# Patient Record
Sex: FEMALE | Race: BLACK OR AFRICAN AMERICAN | Employment: OTHER
[De-identification: names, ages, dates, MRNs, and addresses within clinical notes are randomized per-mention and may not be internally consistent; named-entity substitution may affect disease eponyms.]

---

## 2023-05-19 RX ORDER — GABAPENTIN 300 MG/1
CAPSULE ORAL
COMMUNITY
Start: 2022-03-06

## 2023-05-19 RX ORDER — ACETAMINOPHEN 500 MG
1000 TABLET ORAL EVERY 6 HOURS PRN
COMMUNITY
Start: 2021-08-12

## 2023-05-19 RX ORDER — IBUPROFEN 600 MG/1
600 TABLET ORAL EVERY 6 HOURS PRN
COMMUNITY
Start: 2021-08-12

## 2023-05-19 RX ORDER — FLUOXETINE HYDROCHLORIDE 20 MG/1
60 CAPSULE ORAL DAILY
COMMUNITY

## 2023-05-19 RX ORDER — ALBUTEROL SULFATE 90 UG/1
2 AEROSOL, METERED RESPIRATORY (INHALATION) EVERY 4 HOURS PRN
COMMUNITY

## 2023-05-19 RX ORDER — METHYLPREDNISOLONE 4 MG/1
TABLET ORAL
COMMUNITY
Start: 2022-03-06

## 2024-03-14 ENCOUNTER — OFFICE VISIT (OUTPATIENT)
Facility: CLINIC | Age: 44
End: 2024-03-14
Payer: MEDICAID

## 2024-03-14 ENCOUNTER — HOSPITAL ENCOUNTER (OUTPATIENT)
Facility: HOSPITAL | Age: 44
Setting detail: SPECIMEN
Discharge: HOME | End: 2024-03-14
Payer: MEDICAID

## 2024-03-14 VITALS
TEMPERATURE: 97.7 F | RESPIRATION RATE: 16 BRPM | WEIGHT: 186 LBS | DIASTOLIC BLOOD PRESSURE: 68 MMHG | BODY MASS INDEX: 34.23 KG/M2 | HEART RATE: 75 BPM | SYSTOLIC BLOOD PRESSURE: 118 MMHG | OXYGEN SATURATION: 96 % | HEIGHT: 62 IN

## 2024-03-14 DIAGNOSIS — E11.22 TYPE 2 DIABETES MELLITUS WITH STAGE 4 CHRONIC KIDNEY DISEASE, WITH LONG-TERM CURRENT USE OF INSULIN (HCC): ICD-10-CM

## 2024-03-14 DIAGNOSIS — Z79.4 TYPE 2 DIABETES MELLITUS WITH STAGE 4 CHRONIC KIDNEY DISEASE, WITH LONG-TERM CURRENT USE OF INSULIN (HCC): ICD-10-CM

## 2024-03-14 DIAGNOSIS — N18.4 TYPE 2 DIABETES MELLITUS WITH STAGE 4 CHRONIC KIDNEY DISEASE, WITH LONG-TERM CURRENT USE OF INSULIN (HCC): ICD-10-CM

## 2024-03-14 DIAGNOSIS — Z76.89 ENCOUNTER TO ESTABLISH CARE WITH NEW DOCTOR: Primary | ICD-10-CM

## 2024-03-14 DIAGNOSIS — I25.118 CORONARY ARTERY DISEASE OF NATIVE ARTERY OF NATIVE HEART WITH STABLE ANGINA PECTORIS (HCC): ICD-10-CM

## 2024-03-14 DIAGNOSIS — Z11.4 ENCOUNTER FOR SCREENING FOR HIV: ICD-10-CM

## 2024-03-14 DIAGNOSIS — I25.2 HISTORY OF NON-ST ELEVATION MYOCARDIAL INFARCTION (NSTEMI): ICD-10-CM

## 2024-03-14 DIAGNOSIS — Z11.59 ENCOUNTER FOR HEPATITIS C SCREENING TEST FOR LOW RISK PATIENT: ICD-10-CM

## 2024-03-14 DIAGNOSIS — Z76.89 ENCOUNTER TO ESTABLISH CARE WITH NEW DOCTOR: ICD-10-CM

## 2024-03-14 PROBLEM — E11.9 TYPE 2 DIABETES MELLITUS, WITH LONG-TERM CURRENT USE OF INSULIN (HCC): Status: ACTIVE | Noted: 2019-08-30

## 2024-03-14 PROBLEM — I50.9 CHF (CONGESTIVE HEART FAILURE) (HCC): Status: RESOLVED | Noted: 2023-12-27 | Resolved: 2024-03-14

## 2024-03-14 PROBLEM — I21.4 NSTEMI (NON-ST ELEVATED MYOCARDIAL INFARCTION) (HCC): Status: ACTIVE | Noted: 2023-11-18

## 2024-03-14 PROBLEM — I50.9 CHF (CONGESTIVE HEART FAILURE) (HCC): Status: ACTIVE | Noted: 2023-12-27

## 2024-03-14 LAB
CHOLEST SERPL-MCNC: 247 MG/DL
CREAT UR-MCNC: 61 MG/DL (ref 30–125)
EST. AVERAGE GLUCOSE BLD GHB EST-MCNC: 206 MG/DL
HBA1C MFR BLD: 8.8 % (ref 4.2–5.6)
HDLC SERPL-MCNC: 65 MG/DL (ref 40–60)
HDLC SERPL: 3.8 (ref 0–5)
LDLC SERPL CALC-MCNC: 131.6 MG/DL (ref 0–100)
LIPID PANEL: ABNORMAL
MICROALBUMIN UR-MCNC: 26.1 MG/DL (ref 0–3)
MICROALBUMIN/CREAT UR-RTO: 428 MG/G (ref 0–30)
TRIGL SERPL-MCNC: 252 MG/DL
VLDLC SERPL CALC-MCNC: 50.4 MG/DL

## 2024-03-14 PROCEDURE — 80061 LIPID PANEL: CPT

## 2024-03-14 PROCEDURE — 87389 HIV-1 AG W/HIV-1&-2 AB AG IA: CPT

## 2024-03-14 PROCEDURE — 86803 HEPATITIS C AB TEST: CPT

## 2024-03-14 PROCEDURE — 82043 UR ALBUMIN QUANTITATIVE: CPT

## 2024-03-14 PROCEDURE — 99203 OFFICE O/P NEW LOW 30 MIN: CPT | Performed by: STUDENT IN AN ORGANIZED HEALTH CARE EDUCATION/TRAINING PROGRAM

## 2024-03-14 PROCEDURE — 36415 COLL VENOUS BLD VENIPUNCTURE: CPT

## 2024-03-14 PROCEDURE — 82570 ASSAY OF URINE CREATININE: CPT

## 2024-03-14 PROCEDURE — 83036 HEMOGLOBIN GLYCOSYLATED A1C: CPT

## 2024-03-14 RX ORDER — PREDNISONE 10 MG/1
10 TABLET ORAL DAILY
COMMUNITY

## 2024-03-14 RX ORDER — ASPIRIN 81 MG/1
81 TABLET, CHEWABLE ORAL DAILY
COMMUNITY

## 2024-03-14 RX ORDER — DAPAGLIFLOZIN 10 MG/1
10 TABLET, FILM COATED ORAL EVERY MORNING
COMMUNITY

## 2024-03-14 RX ORDER — ATORVASTATIN CALCIUM 40 MG/1
40 TABLET, FILM COATED ORAL DAILY
COMMUNITY

## 2024-03-14 RX ORDER — NITROGLYCERIN 0.4 MG/1
0.4 TABLET SUBLINGUAL EVERY 5 MIN PRN
COMMUNITY

## 2024-03-14 RX ORDER — METOPROLOL SUCCINATE 50 MG/1
50 TABLET, EXTENDED RELEASE ORAL DAILY
COMMUNITY

## 2024-03-14 RX ORDER — FAMOTIDINE 40 MG/1
40 TABLET, FILM COATED ORAL DAILY
COMMUNITY

## 2024-03-14 RX ORDER — BUTALBITAL, ACETAMINOPHEN AND CAFFEINE 50; 325; 40 MG/1; MG/1; MG/1
1 TABLET ORAL EVERY 4 HOURS PRN
COMMUNITY

## 2024-03-14 RX ORDER — INSULIN GLARGINE 100 [IU]/ML
INJECTION, SOLUTION SUBCUTANEOUS NIGHTLY
COMMUNITY

## 2024-03-14 RX ORDER — MIRTAZAPINE 15 MG/1
15 TABLET, FILM COATED ORAL NIGHTLY
COMMUNITY

## 2024-03-14 RX ORDER — PREDNISONE 20 MG/1
20 TABLET ORAL DAILY
COMMUNITY

## 2024-03-14 RX ORDER — POTASSIUM CHLORIDE 1.5 G/1.58G
20 POWDER, FOR SOLUTION ORAL 2 TIMES DAILY
COMMUNITY

## 2024-03-14 RX ORDER — METOLAZONE 5 MG/1
5 TABLET ORAL DAILY
COMMUNITY

## 2024-03-14 SDOH — ECONOMIC STABILITY: FOOD INSECURITY: WITHIN THE PAST 12 MONTHS, THE FOOD YOU BOUGHT JUST DIDN'T LAST AND YOU DIDN'T HAVE MONEY TO GET MORE.: NEVER TRUE

## 2024-03-14 SDOH — ECONOMIC STABILITY: INCOME INSECURITY: HOW HARD IS IT FOR YOU TO PAY FOR THE VERY BASICS LIKE FOOD, HOUSING, MEDICAL CARE, AND HEATING?: NOT HARD AT ALL

## 2024-03-14 SDOH — ECONOMIC STABILITY: HOUSING INSECURITY
IN THE LAST 12 MONTHS, WAS THERE A TIME WHEN YOU DID NOT HAVE A STEADY PLACE TO SLEEP OR SLEPT IN A SHELTER (INCLUDING NOW)?: NO

## 2024-03-14 SDOH — ECONOMIC STABILITY: FOOD INSECURITY: WITHIN THE PAST 12 MONTHS, YOU WORRIED THAT YOUR FOOD WOULD RUN OUT BEFORE YOU GOT MONEY TO BUY MORE.: NEVER TRUE

## 2024-03-14 ASSESSMENT — PATIENT HEALTH QUESTIONNAIRE - PHQ9
SUM OF ALL RESPONSES TO PHQ QUESTIONS 1-9: 0
2. FEELING DOWN, DEPRESSED OR HOPELESS: 0
SUM OF ALL RESPONSES TO PHQ QUESTIONS 1-9: 0

## 2024-03-14 NOTE — PROGRESS NOTES
Kalie López is a 43 y.o. year old female who presents today for   Chief Complaint   Patient presents with    New Patient     Rght shoulder pain since 2009  left hip pain for like three years       Is someone accompanying this pt? no    Is the patient using any DME equipment during OV? no    Depression Screening:       3/14/2024     1:33 PM   PHQ-9 Questionaire   Feeling down, depressed, or hopeless 0   PHQ-9 Total Score 0       Abuse Screening:      3/14/2024     1:00 PM   AMB Abuse Screening   Do you ever feel afraid of your partner? N   Are you in a relationship with someone who physically or mentally threatens you? N   Is it safe for you to go home? Y       Learning Assessment:  Who is the primary learner? Patient    What is the preferred language for health care of the primary learner? ENGLISH    How does the primary learner prefer to learn new concepts? OTHER (COMMENT) all of the above   Answered By patient    Relationship to Learner SELF    Are there any barriers / factors that could impact learning? NONE        Fall Risk:       No data to display                    Coordination of Care:   1. \"Have you been to the ER, urgent care clinic since your last visit?  Hospitalized since your last visit?\" no    2. \"Have you seen or consulted any other health care providers outside of the [facility] since your last visit?\" no    3. For patients aged 45-75: Has the patient had a colonoscopy / FIT/ Cologuard? N/a    If the patient is female:    4. For patients aged 40-74: Has the patient had a mammogram within the past 2 years? yes    5. For patients aged 21-65: Has the patient had a pap smear? yes    Health Maintenance: reviewed and discussed and ordered per Provider.    Health Maintenance Due   Topic Date Due    Hepatitis B vaccine (1 of 3 - 3-dose series) Never done    Varicella vaccine (1 of 2 - 2-dose childhood series) Never done    Depression Monitoring  Never done    HIV screen  Never done    
Confirm; Future  4. Type 2 diabetes mellitus with stage 4 chronic kidney disease, with long-term current use of insulin (HCC)  Assessment & Plan:  - reportedly uncontrolled  - seeing endo at [facility]   Orders:  -     Microalbumin / Creatinine Urine Ratio; Future  5. Coronary artery disease of native artery of native heart with stable angina pectoris (HCC)  -     [facility] ([address] [place] [address])  6. History of non-ST elevation myocardial infarction (NSTEMI)  -     [facility] ([address] [place] [address])      Follow-up and Dispositions    Return in about 2 weeks (around 3/28/2024) for lab results.         On this date 3/14/2024 I have spent 39 minutes reviewing previous notes, test results and face to face with the patient discussing the diagnosis and importance of compliance with the treatment plan as well as documenting on the day of the visit.    I have discussed the diagnosis with the patient and the intended plan as seen in the above orders.  The patient has received an after-visit summary and questions were answered concerning future plans.  I have discussed medication side effects and warnings with the patient as well. I have reviewed the plan of care with the patient, accepted their input and they are in agreement with the treatment goals.     Jalen Mabry MD  March 14, 2024

## 2024-03-14 NOTE — PATIENT INSTRUCTIONS
It was nice meeting you today.  Please have your labs done either immediately after this visit, or you can schedule at the  to come back for labs.  Please do your labs at least a week before your next appointment.    If you have questions or concerns, My Chart is the fastest way to get in touch with me and the clinical staff.    Please arrive to the clinic at least 10 minutes before your appointment. This will insure you have the most amount of time with the medical provider.  If you arrive after the start of your appointment, you will have to reschedule.

## 2024-03-15 LAB
HIV 1+2 AB+HIV1 P24 AG SERPL QL IA: NONREACTIVE
HIV 1/2 RESULT COMMENT: NORMAL

## 2024-03-19 LAB
HCV AB SERPL QL IA: NORMAL
HCV IGG SERPL QL IA: NON REACTIVE S/CO RATIO

## 2024-04-16 ENCOUNTER — OFFICE VISIT (OUTPATIENT)
Facility: CLINIC | Age: 44
End: 2024-04-16
Payer: MEDICAID

## 2024-04-16 VITALS
HEIGHT: 62 IN | BODY MASS INDEX: 38.9 KG/M2 | TEMPERATURE: 97.2 F | RESPIRATION RATE: 18 BRPM | HEART RATE: 71 BPM | OXYGEN SATURATION: 98 % | WEIGHT: 211.4 LBS | DIASTOLIC BLOOD PRESSURE: 72 MMHG | SYSTOLIC BLOOD PRESSURE: 117 MMHG

## 2024-04-16 DIAGNOSIS — Z79.4 TYPE 2 DIABETES MELLITUS WITH STAGE 4 CHRONIC KIDNEY DISEASE, WITH LONG-TERM CURRENT USE OF INSULIN (HCC): Primary | ICD-10-CM

## 2024-04-16 DIAGNOSIS — J45.20 MILD INTERMITTENT ASTHMA, UNSPECIFIED WHETHER COMPLICATED: ICD-10-CM

## 2024-04-16 DIAGNOSIS — E78.00 PURE HYPERCHOLESTEROLEMIA: ICD-10-CM

## 2024-04-16 DIAGNOSIS — E11.22 TYPE 2 DIABETES MELLITUS WITH STAGE 4 CHRONIC KIDNEY DISEASE, WITH LONG-TERM CURRENT USE OF INSULIN (HCC): Primary | ICD-10-CM

## 2024-04-16 DIAGNOSIS — I25.2 HISTORY OF NON-ST ELEVATION MYOCARDIAL INFARCTION (NSTEMI): ICD-10-CM

## 2024-04-16 DIAGNOSIS — N18.4 TYPE 2 DIABETES MELLITUS WITH STAGE 4 CHRONIC KIDNEY DISEASE, WITH LONG-TERM CURRENT USE OF INSULIN (HCC): Primary | ICD-10-CM

## 2024-04-16 PROCEDURE — 99214 OFFICE O/P EST MOD 30 MIN: CPT | Performed by: STUDENT IN AN ORGANIZED HEALTH CARE EDUCATION/TRAINING PROGRAM

## 2024-04-16 PROCEDURE — 3052F HG A1C>EQUAL 8.0%<EQUAL 9.0%: CPT | Performed by: STUDENT IN AN ORGANIZED HEALTH CARE EDUCATION/TRAINING PROGRAM

## 2024-04-16 RX ORDER — ATORVASTATIN CALCIUM 80 MG/1
80 TABLET, FILM COATED ORAL DAILY
Qty: 90 TABLET | Refills: 3 | Status: SHIPPED | OUTPATIENT
Start: 2024-04-16

## 2024-04-16 RX ORDER — ALBUTEROL SULFATE 90 UG/1
2 AEROSOL, METERED RESPIRATORY (INHALATION) EVERY 4 HOURS PRN
Qty: 18 G | Refills: 5 | Status: SHIPPED | OUTPATIENT
Start: 2024-04-16

## 2024-04-16 RX ORDER — ATORVASTATIN CALCIUM 80 MG/1
80 TABLET, FILM COATED ORAL DAILY
COMMUNITY
End: 2024-04-16 | Stop reason: SDUPTHER

## 2024-04-16 RX ORDER — DAPAGLIFLOZIN 10 MG/1
10 TABLET, FILM COATED ORAL EVERY MORNING
Qty: 90 TABLET | Refills: 3 | Status: SHIPPED | OUTPATIENT
Start: 2024-04-16

## 2024-04-16 RX ORDER — ASPIRIN 81 MG/1
81 TABLET, CHEWABLE ORAL DAILY
Qty: 90 TABLET | Refills: 3 | Status: SHIPPED | OUTPATIENT
Start: 2024-04-16

## 2024-04-16 NOTE — PROGRESS NOTES
Kalie López (:  1980) is a 43 y.o. female here for evaluation of the following chief complaint(s):  No chief complaint on file.        ASSESSMENT/PLAN:  1. Type 2 diabetes mellitus with stage 4 chronic kidney disease, with long-term current use of insulin (HCC)  Assessment & Plan:  - uncontrolled and reportedly worse than our A1C shows  - will see pt after her endo appt; if unsatisfactory care will discuss referral to pharmD or different endo   Orders:  -     dapagliflozin (FARXIGA) 10 MG tablet; Take 1 tablet by mouth every morning, Disp-90 tablet, R-3Normal  2. Pure hypercholesterolemia  Assessment & Plan:  - goal LDL< 70  - increase lipitor   Orders:  -     atorvastatin (LIPITOR) 80 MG tablet; Take 1 tablet by mouth daily, Disp-90 tablet, R-3Normal  3. History of non-ST elevation myocardial infarction (NSTEMI)  Assessment & Plan:  - improve cholesterol and BG  - keep cards appt   Orders:  -     aspirin 81 MG chewable tablet; Take 1 tablet by mouth daily, Disp-90 tablet, R-3Normal  4. Mild intermittent asthma, unspecified whether complicated  -     albuterol sulfate HFA (PROVENTIL;VENTOLIN;PROAIR) 108 (90 Base) MCG/ACT inhaler; Inhale 2 puffs into the lungs every 4 hours as needed for Wheezing, Disp-18 g, R-5Normal      Follow-up and Dispositions    Return in about 31 days (around 2024) for check up.         SUBJECTIVE/OBJECTIVE:  HPI  Lab result  - A1C 8.8,  (increase lipitor to 80)    T2DM  - sees Amanda hargrove; last on 24; fu in April  - latest A1C around 8  - regular insulin 36U and SS, Lantus 10U, farxiga 10  Update (24)  - to see endo on ; more recent A1C is 10  - diet seems appropriate; taking prednisone  - has not seen opto; [facility]  - lipitor  Update (24)  - out of it for two weeks     CAD  - MI in 2023  - has not fu with cardiology  - ASA, lipitor, metoprolol, nitro (only used once)  Update (24)  - to

## 2024-04-16 NOTE — PROGRESS NOTES
Kalie López is a 43 y.o. year old female who presents today for No chief complaint on file.      Is someone accompanying this pt? no    Is the patient using any DME equipment during OV? no    Depression Screening:       3/14/2024     1:33 PM   PHQ-9 Questionaire   Feeling down, depressed, or hopeless 0   PHQ-9 Total Score 0       Abuse Screening:      3/14/2024     1:00 PM   AMB Abuse Screening   Do you ever feel afraid of your partner? N   Are you in a relationship with someone who physically or mentally threatens you? N   Is it safe for you to go home? Y       Learning Assessment:  No question data found.    Fall Risk:       No data to display                    Coordination of Care:   1. \"Have you been to the ER, urgent care clinic since your last visit?  Hospitalized since your last visit?\" no    2. \"Have you seen or consulted any other health care providers outside of the [facility] since your last visit?\" no    3. For patients aged 45-75: Has the patient had a colonoscopy / FIT/ Cologuard? N/a    If the patient is female:    4. For patients aged 40-74: Has the patient had a mammogram within the past 2 years? yes    5. For patients aged 21-65: Has the patient had a pap smear? yes    Health Maintenance: reviewed and discussed and ordered per Provider.    Health Maintenance Due   Topic Date Due    Hepatitis B vaccine (1 of 3 - 3-dose series) Never done    Varicella vaccine (1 of 2 - 2-dose childhood series) Never done    Diabetic foot exam  Never done    Diabetic retinal exam  Never done    GFR test (Diabetes, CKD 3-4, OR last GFR 15-59)  08/10/2022    DTaP/Tdap/Td vaccine (2 - Td or Tdap) 03/13/2023    COVID-19 Vaccine (3 - 2023-24 season) 09/01/2023        - Neris Briones CMA  [facility]  Phone: [phone]  Fax: [phone]

## 2024-04-16 NOTE — PATIENT INSTRUCTIONS
You will need a retina exam for your diabetes. Please have the optometrist send me the results.     Please keep in mind that [facility] policy schedules follow up appointments to last 15 minutes and new problem appointments to last 30 minutes.  If you arrive more than shelter through your scheduled appointment, staff will offer to reschedule you. This is to ensure you and the provider have enough time to complete a high quality encounter.  Please arrive to your appointments early to avoid any unforseen delays.

## 2024-05-02 ENCOUNTER — TELEPHONE (OUTPATIENT)
Facility: CLINIC | Age: 44
End: 2024-05-02

## 2024-05-02 DIAGNOSIS — F33.41 RECURRENT MAJOR DEPRESSIVE DISORDER, IN PARTIAL REMISSION (HCC): Primary | ICD-10-CM

## 2024-05-02 RX ORDER — FLUOXETINE HYDROCHLORIDE 20 MG/1
60 CAPSULE ORAL DAILY
Qty: 270 CAPSULE | Refills: 3 | Status: SHIPPED | OUTPATIENT
Start: 2024-05-02

## 2024-05-02 NOTE — TELEPHONE ENCOUNTER
Medication(s) requesting:   Requested Prescriptions     Pending Prescriptions Disp Refills    FLUoxetine (PROZAC) 20 MG capsule 30 capsule      Sig: Take 3 capsules by mouth daily       Last office visit:  4/16/2024  Next office visit DMA: 5/17/2024

## 2024-05-02 NOTE — TELEPHONE ENCOUNTER
[employer]  is requesting a PA on the below medication       LOV:  4/16/2024      Please advise    Thank you

## 2024-05-07 ENCOUNTER — OFFICE VISIT (OUTPATIENT)
Age: 44
End: 2024-05-07
Payer: MEDICAID

## 2024-05-07 VITALS
BODY MASS INDEX: 38.96 KG/M2 | WEIGHT: 213 LBS | SYSTOLIC BLOOD PRESSURE: 122 MMHG | DIASTOLIC BLOOD PRESSURE: 69 MMHG | TEMPERATURE: 97.4 F | HEART RATE: 80 BPM | OXYGEN SATURATION: 97 %

## 2024-05-07 DIAGNOSIS — I20.89 ATYPICAL ANGINA (HCC): Primary | ICD-10-CM

## 2024-05-07 DIAGNOSIS — R94.31 ABNORMAL ECG: ICD-10-CM

## 2024-05-07 DIAGNOSIS — R01.1 SYSTOLIC MURMUR: ICD-10-CM

## 2024-05-07 DIAGNOSIS — E66.09 CLASS 2 OBESITY DUE TO EXCESS CALORIES WITHOUT SERIOUS COMORBIDITY WITH BODY MASS INDEX (BMI) OF 38.0 TO 38.9 IN ADULT: ICD-10-CM

## 2024-05-07 DIAGNOSIS — I10 PRIMARY HYPERTENSION: ICD-10-CM

## 2024-05-07 DIAGNOSIS — Z72.0 TOBACCO ABUSE: ICD-10-CM

## 2024-05-07 DIAGNOSIS — G47.33 OBSTRUCTIVE SLEEP APNEA: ICD-10-CM

## 2024-05-07 PROCEDURE — 3074F SYST BP LT 130 MM HG: CPT | Performed by: INTERNAL MEDICINE

## 2024-05-07 PROCEDURE — 99204 OFFICE O/P NEW MOD 45 MIN: CPT | Performed by: INTERNAL MEDICINE

## 2024-05-07 PROCEDURE — 3078F DIAST BP <80 MM HG: CPT | Performed by: INTERNAL MEDICINE

## 2024-05-07 RX ORDER — LOSARTAN POTASSIUM 25 MG/1
25 TABLET ORAL DAILY
Qty: 90 TABLET | Refills: 3 | Status: SHIPPED | OUTPATIENT
Start: 2024-05-07

## 2024-05-07 ASSESSMENT — ENCOUNTER SYMPTOMS
EYES NEGATIVE: 1
ALLERGIC/IMMUNOLOGIC NEGATIVE: 1
GASTROINTESTINAL NEGATIVE: 1
SHORTNESS OF BREATH: 1

## 2024-05-07 NOTE — PROGRESS NOTES
Identified pt with two pt identifiers(name and ). Reviewed record in preparation for visit and have obtained necessary documentation.    Kalie López presents today for   Chief Complaint   Patient presents with    New Patient       Pt c/o DIZZINESS, SOB, CHEST PAIN, FATIGUE/WEAKNESS, SWELLING.             Kalie López preferred language for health care discussion is english/other.    Personal Protective Equipment:   Personal Protective Equipment was used including: mask-surgical and hands-gloves. Patient was placed on no precaution(s). Patient was not masked.    Precautions:   Patient currently on None  Patient currently roomed with door closed.    Is someone accompanying this pt? no    Is the patient using any DME equipment during OV? no    Depression Screening:      3/14/2024     1:33 PM   PHQ-9 Questionaire   Feeling down, depressed, or hopeless 0   PHQ-9 Total Score 0        Learning Assessment:  No question data found.    Abuse Screenin/7/2024     2:00 PM 3/14/2024     1:00 PM   AMB Abuse Screening   Do you ever feel afraid of your partner? N N   Are you in a relationship with someone who physically or mentally threatens you? N N   Is it safe for you to go home? Y Y          Fall Risk      2024     2:21 PM   Fall Risk   2 or more falls in past year? yes   Fall with injury in past year? no         Pt currently taking Anticoagulant /Antiplatelet therapy? Aspirin 81    Coordination of Care:  1. Have you been to the ER, urgent care clinic since your last visit? Hospitalized since your last visit? no    2. Have you seen or consulted any other health care providers outside of the [facility] since your last visit? Include any pap smears or colon screening. no      Please see Red banners under Allergies and Med Rec to remove outside inquires. All correct information has been verified with patient and added to chart.     Medication's patient's would liked removed has been marked 
    Allergies   Allergen Reactions    Glimepiride Hives    Oxycodone-Acetaminophen Itching    Shellfish Allergy Hives and Itching     SOB    Methocarbamol Rash    Tramadol Rash        Current Outpatient Medications   Medication Sig Dispense Refill    losartan (COZAAR) 25 MG tablet Take 1 tablet by mouth daily 90 tablet 3    atorvastatin (LIPITOR) 80 MG tablet Take 1 tablet by mouth daily 90 tablet 3    dapagliflozin (FARXIGA) 10 MG tablet Take 1 tablet by mouth every morning 90 tablet 3    aspirin 81 MG chewable tablet Take 1 tablet by mouth daily 90 tablet 3    potassium chloride (KLOR-CON) 20 MEQ packet Take 20 mEq by mouth 2 times daily      metoprolol succinate (TOPROL XL) 50 MG extended release tablet Take 1 tablet by mouth daily      famotidine (PEPCID) 40 MG tablet Take 1 tablet by mouth daily      nitroGLYCERIN (NITROSTAT) 0.4 MG SL tablet Place 1 tablet under the tongue every 5 minutes as needed for Chest pain up to max of 3 total doses. If no relief after 1 dose, call 911.      FLUoxetine (PROZAC) 20 MG capsule Take 3 capsules by mouth daily 270 capsule 3    Continuous Blood Gluc  (DEXCOM G7 ) JUSTA USE WITH DEXCOM SENSOR      albuterol sulfate HFA (PROVENTIL;VENTOLIN;PROAIR) 108 (90 Base) MCG/ACT inhaler Inhale 2 puffs into the lungs every 4 hours as needed for Wheezing 18 g 5    mirtazapine (REMERON) 15 MG tablet Take 1 tablet by mouth nightly      butalbital-acetaminophen-caffeine (FIORICET, ESGIC) -40 MG per tablet Take 1 tablet by mouth every 4 hours as needed for Headaches      metOLazone (ZAROXOLYN) 5 MG tablet Take 1 tablet by mouth daily      predniSONE (DELTASONE) 20 MG tablet Take 1 tablet by mouth daily      Insulin Aspart, w/Niacinamide, (FIASP IJ) Inject as directed      insulin glargine (LANTUS) 100 UNIT/ML injection vial Inject 10 Units into the skin nightly       No current facility-administered medications for this visit.        Social History     Tobacco Use

## 2024-05-17 ENCOUNTER — OFFICE VISIT (OUTPATIENT)
Facility: CLINIC | Age: 44
End: 2024-05-17

## 2024-05-17 VITALS
HEIGHT: 62 IN | TEMPERATURE: 97.3 F | BODY MASS INDEX: 38.35 KG/M2 | WEIGHT: 208.4 LBS | SYSTOLIC BLOOD PRESSURE: 123 MMHG | OXYGEN SATURATION: 94 % | DIASTOLIC BLOOD PRESSURE: 85 MMHG | HEART RATE: 77 BPM

## 2024-05-17 DIAGNOSIS — I25.2 HISTORY OF NON-ST ELEVATION MYOCARDIAL INFARCTION (NSTEMI): ICD-10-CM

## 2024-05-17 DIAGNOSIS — N05.0 MINIMAL CHANGE DISEASE: ICD-10-CM

## 2024-05-17 DIAGNOSIS — Z79.4 TYPE 2 DIABETES MELLITUS WITH DIABETIC POLYNEUROPATHY, WITH LONG-TERM CURRENT USE OF INSULIN (HCC): Primary | ICD-10-CM

## 2024-05-17 DIAGNOSIS — E11.42 TYPE 2 DIABETES MELLITUS WITH DIABETIC POLYNEUROPATHY, WITH LONG-TERM CURRENT USE OF INSULIN (HCC): Primary | ICD-10-CM

## 2024-05-17 SDOH — ECONOMIC STABILITY: INCOME INSECURITY: HOW HARD IS IT FOR YOU TO PAY FOR THE VERY BASICS LIKE FOOD, HOUSING, MEDICAL CARE, AND HEATING?: NOT HARD AT ALL

## 2024-05-17 SDOH — ECONOMIC STABILITY: FOOD INSECURITY: WITHIN THE PAST 12 MONTHS, THE FOOD YOU BOUGHT JUST DIDN'T LAST AND YOU DIDN'T HAVE MONEY TO GET MORE.: NEVER TRUE

## 2024-05-17 SDOH — ECONOMIC STABILITY: FOOD INSECURITY: WITHIN THE PAST 12 MONTHS, YOU WORRIED THAT YOUR FOOD WOULD RUN OUT BEFORE YOU GOT MONEY TO BUY MORE.: NEVER TRUE

## 2024-05-17 ASSESSMENT — PATIENT HEALTH QUESTIONNAIRE - PHQ9
SUM OF ALL RESPONSES TO PHQ QUESTIONS 1-9: 0
SUM OF ALL RESPONSES TO PHQ QUESTIONS 1-9: 0
SUM OF ALL RESPONSES TO PHQ9 QUESTIONS 1 & 2: 0
1. LITTLE INTEREST OR PLEASURE IN DOING THINGS: NOT AT ALL
SUM OF ALL RESPONSES TO PHQ QUESTIONS 1-9: 0
SUM OF ALL RESPONSES TO PHQ QUESTIONS 1-9: 0
2. FEELING DOWN, DEPRESSED OR HOPELESS: NOT AT ALL

## 2024-05-17 NOTE — PROGRESS NOTES
Kalie López is a 44 y.o. year old female who presents today for   Chief Complaint   Patient presents with    Follow-up     31 dy follow up       Is someone accompanying this pt? no    Is the patient using any DME equipment during OV? no    Depression Screenin/17/2024     9:08 AM 3/14/2024     1:33 PM   PHQ-9 Questionaire   Little interest or pleasure in doing things 0    Feeling down, depressed, or hopeless 0 0   PHQ-9 Total Score 0 0       Abuse Screenin/17/2024     9:00 AM 2024     2:00 PM 3/14/2024     1:00 PM   AMB Abuse Screening   Do you ever feel afraid of your partner?  N N   Are you in a relationship with someone who physically or mentally threatens you? N N N   Is it safe for you to go home? Y Y Y       Learning Assessment:  No question data found.    Fall Risk:      2024     2:21 PM   Fall Risk   2 or more falls in past year? yes   Fall with injury in past year? no           Coordination of Care:   1. \"Have you been to the ER, urgent care clinic since your last visit?  Hospitalized since your last visit?\" no    2. \"Have you seen or consulted any other health care providers outside of the [facility] since your last visit?\" no    3. For patients aged 45-75: Has the patient had a colonoscopy / FIT/ Cologuard? N/a    If the patient is female:    4. For patients aged 40-74: Has the patient had a mammogram within the past 2 years? yes    5. For patients aged 21-65: Has the patient had a pap smear? yes    Health Maintenance: reviewed and discussed and ordered per Provider.    Health Maintenance Due   Topic Date Due    Hepatitis B vaccine (1 of 3 - 3-dose series) Never done    Varicella vaccine (1 of 2 - 2-dose childhood series) Never done    Diabetic foot exam  Never done    Diabetic retinal exam  Never done    DTaP/Tdap/Td vaccine (2 - Td or Tdap) 2023    COVID-19 Vaccine (3 - -24 season) 2023    Cervical cancer screen  2024        Giancarlo Cordon

## 2024-05-17 NOTE — ASSESSMENT & PLAN NOTE
- cards work up was negative   - cardiac CTA showed minimal stenosis of  LAD  - echo: EF 67%, NL diastolic function

## 2024-05-17 NOTE — PROGRESS NOTES
Kalie López (:  1980) is a 44 y.o. female here for evaluation of the following chief complaint(s):  Follow-up (31 dy follow up)        ASSESSMENT/PLAN:  1. Type 2 diabetes mellitus with diabetic polyneuropathy, with long-term current use of insulin (HCC)  Assessment & Plan:  - uncontroled  - insulin pump pending with endo   2. Minimal change disease  Assessment & Plan:  - prednisone is making BG control challenging  - keep nephro appt and enquire about stopping prednisone   3. History of non-ST elevation myocardial infarction (NSTEMI)  Assessment & Plan:  - cards work up was negative       Follow-up and Dispositions    Return in about 30 weeks (around 2024) for T2DM.         SUBJECTIVE/OBJECTIVE:  HPI  T2DM  - sees Amanda hargrove; last on 24; fu in April  - latest A1C around 8  - regular insulin 36U and SS, Lantus 10U, farxiga 10  Update (24)  - to see endo on ; more recent A1C is 10  - diet seems appropriate; taking prednisone  - has not seen opto; Dr. Al at [facility] in [facility]  Update (24)  - saw endo; they are working on an insulin pump; f/u in July  - predisone (for CKD/DELORES) is a challenge  - reports moderate numbness/tingling in both feet x 6 months    CAD  - MI in 2023  - has not fu with cardiology  - ASA, lipitor, metoprolol, nitro (only used once)  Update (24)  - to see Dr. Kazt next month  Update (24)  - saw Dr. Katz  - cardiac CTA showed minimal stenosis of  LAD  - echo: EF 67%, NL diastolic function    CKD/DELORES  -  fu with nephro on     ROS as stated above.    /85 (Site: Left Upper Arm, Position: Sitting, Cuff Size: Large Adult)   Pulse 77   Temp 97.3 °F (36.3 °C)   Ht 1.575 m (5' 2\")   Wt 94.5 kg (208 lb 6.4 oz)   LMP  (LMP Unknown)   SpO2 94%   BMI 38.12 kg/m²     Physical Exam  Vitals and nursing note reviewed.   Constitutional:       Appearance: She is not ill-appearing.   Cardiovascular:      Rate and Rhythm:  Encounter addended by: Malena Rose RN on: 8/24/2017 11:34 AM<BR>    Actions taken: Letter status changed

## 2024-05-17 NOTE — ASSESSMENT & PLAN NOTE
- prednisone is making BG control challenging  - keep nephro appt and enquire about stopping prednisone

## 2024-05-17 NOTE — PATIENT INSTRUCTIONS
I'll see you in December to check your A1C and talk about your heart, kidneys, and T2DM.    Pleas see your optometrist for a retina exam.    For your neuropathy, we can treat it with either gabapentin or pregabilin

## 2024-05-29 ENCOUNTER — PATIENT MESSAGE (OUTPATIENT)
Facility: CLINIC | Age: 44
End: 2024-05-29

## 2024-05-30 RX ORDER — ACYCLOVIR 400 MG/1
TABLET ORAL
COMMUNITY
Start: 2024-05-02 | End: 2024-05-30 | Stop reason: SDUPTHER

## 2024-05-30 RX ORDER — ACYCLOVIR 400 MG/1
TABLET ORAL
Qty: 3 EACH | Refills: 5 | Status: SHIPPED | OUTPATIENT
Start: 2024-05-30

## 2024-06-06 ENCOUNTER — PATIENT MESSAGE (OUTPATIENT)
Facility: CLINIC | Age: 44
End: 2024-06-06

## 2024-06-06 DIAGNOSIS — M79.671 PAIN IN BOTH FEET: ICD-10-CM

## 2024-06-06 DIAGNOSIS — E11.42 TYPE 2 DIABETES MELLITUS WITH DIABETIC POLYNEUROPATHY, WITH LONG-TERM CURRENT USE OF INSULIN (HCC): Primary | ICD-10-CM

## 2024-06-06 DIAGNOSIS — Z79.4 TYPE 2 DIABETES MELLITUS WITH DIABETIC POLYNEUROPATHY, WITH LONG-TERM CURRENT USE OF INSULIN (HCC): Primary | ICD-10-CM

## 2024-06-06 DIAGNOSIS — M79.672 PAIN IN BOTH FEET: ICD-10-CM

## 2024-07-17 ENCOUNTER — OFFICE VISIT (OUTPATIENT)
Facility: CLINIC | Age: 44
End: 2024-07-17
Payer: MEDICAID

## 2024-07-17 ENCOUNTER — HOSPITAL ENCOUNTER (OUTPATIENT)
Facility: HOSPITAL | Age: 44
Setting detail: SPECIMEN
Discharge: HOME | End: 2024-07-20
Payer: MEDICAID

## 2024-07-17 VITALS
BODY MASS INDEX: 38.94 KG/M2 | HEIGHT: 62 IN | HEART RATE: 71 BPM | DIASTOLIC BLOOD PRESSURE: 72 MMHG | RESPIRATION RATE: 12 BRPM | WEIGHT: 211.6 LBS | TEMPERATURE: 97.9 F | SYSTOLIC BLOOD PRESSURE: 105 MMHG | OXYGEN SATURATION: 98 %

## 2024-07-17 DIAGNOSIS — R10.84 GENERALIZED ABDOMINAL PAIN: ICD-10-CM

## 2024-07-17 DIAGNOSIS — E11.42 TYPE 2 DIABETES MELLITUS WITH DIABETIC POLYNEUROPATHY, WITH LONG-TERM CURRENT USE OF INSULIN (HCC): Primary | ICD-10-CM

## 2024-07-17 DIAGNOSIS — Z79.4 TYPE 2 DIABETES MELLITUS WITH DIABETIC POLYNEUROPATHY, WITH LONG-TERM CURRENT USE OF INSULIN (HCC): Primary | ICD-10-CM

## 2024-07-17 DIAGNOSIS — F17.200 SMOKER: ICD-10-CM

## 2024-07-17 DIAGNOSIS — G63 POLYNEUROPATHY ASSOCIATED WITH UNDERLYING DISEASE (HCC): ICD-10-CM

## 2024-07-17 LAB
ALBUMIN SERPL-MCNC: 4.1 G/DL (ref 3.4–5)
ALBUMIN/GLOB SERPL: 1.5 (ref 0.8–1.7)
ALP SERPL-CCNC: 73 U/L (ref 45–117)
ALT SERPL-CCNC: 32 U/L (ref 13–56)
ANION GAP SERPL CALC-SCNC: 5 MMOL/L (ref 3–18)
AST SERPL-CCNC: 25 U/L (ref 10–38)
BILIRUB SERPL-MCNC: 0.5 MG/DL (ref 0.2–1)
BUN SERPL-MCNC: 23 MG/DL (ref 7–18)
BUN/CREAT SERPL: 22 (ref 12–20)
CALCIUM SERPL-MCNC: 9.5 MG/DL (ref 8.5–10.1)
CHLORIDE SERPL-SCNC: 111 MMOL/L (ref 100–111)
CO2 SERPL-SCNC: 26 MMOL/L (ref 21–32)
CREAT SERPL-MCNC: 1.04 MG/DL (ref 0.6–1.3)
GLOBULIN SER CALC-MCNC: 2.8 G/DL (ref 2–4)
GLUCOSE SERPL-MCNC: 114 MG/DL (ref 74–99)
HBA1C MFR BLD: 9.9 %
LIPASE SERPL-CCNC: 63 U/L (ref 13–75)
POTASSIUM SERPL-SCNC: 4.5 MMOL/L (ref 3.5–5.5)
PROT SERPL-MCNC: 6.9 G/DL (ref 6.4–8.2)
SODIUM SERPL-SCNC: 142 MMOL/L (ref 136–145)

## 2024-07-17 PROCEDURE — 83036 HEMOGLOBIN GLYCOSYLATED A1C: CPT | Performed by: STUDENT IN AN ORGANIZED HEALTH CARE EDUCATION/TRAINING PROGRAM

## 2024-07-17 PROCEDURE — 3052F HG A1C>EQUAL 8.0%<EQUAL 9.0%: CPT | Performed by: STUDENT IN AN ORGANIZED HEALTH CARE EDUCATION/TRAINING PROGRAM

## 2024-07-17 PROCEDURE — 99214 OFFICE O/P EST MOD 30 MIN: CPT | Performed by: STUDENT IN AN ORGANIZED HEALTH CARE EDUCATION/TRAINING PROGRAM

## 2024-07-17 PROCEDURE — 36415 COLL VENOUS BLD VENIPUNCTURE: CPT

## 2024-07-17 PROCEDURE — G2211 COMPLEX E/M VISIT ADD ON: HCPCS | Performed by: STUDENT IN AN ORGANIZED HEALTH CARE EDUCATION/TRAINING PROGRAM

## 2024-07-17 PROCEDURE — 80053 COMPREHEN METABOLIC PANEL: CPT

## 2024-07-17 PROCEDURE — 83690 ASSAY OF LIPASE: CPT

## 2024-07-17 RX ORDER — OMEPRAZOLE 20 MG/1
20 CAPSULE, DELAYED RELEASE ORAL
Qty: 30 CAPSULE | Refills: 0 | Status: SHIPPED | OUTPATIENT
Start: 2024-07-17

## 2024-07-17 RX ORDER — NICOTINE 21 MG/24HR
1 PATCH, TRANSDERMAL 24 HOURS TRANSDERMAL DAILY
Qty: 42 PATCH | Refills: 0 | Status: SHIPPED | OUTPATIENT
Start: 2024-07-17 | End: 2024-08-28

## 2024-07-17 RX ORDER — GABAPENTIN 100 MG/1
100 CAPSULE ORAL 3 TIMES DAILY
Qty: 90 CAPSULE | Refills: 1 | Status: SHIPPED | OUTPATIENT
Start: 2024-07-17 | End: 2024-09-15

## 2024-07-17 RX ORDER — DICYCLOMINE HYDROCHLORIDE 10 MG/1
10 CAPSULE ORAL
Qty: 360 CAPSULE | Refills: 0 | Status: SHIPPED | OUTPATIENT
Start: 2024-07-17

## 2024-07-17 NOTE — ASSESSMENT & PLAN NOTE
- chronic; large differential; possible IBS, GERD, food allergy, hernia  - belly labs  - trial of PPI, bentyl, and FODMAP diet (not simultaneously; pt to try each alone)  - RTC x 1 month

## 2024-07-17 NOTE — PROGRESS NOTES
Kalie López is a 44 y.o. year old female who presents today for   Chief Complaint   Patient presents with    Abdominal Pain    Foot Pain       Is someone accompanying this pt? No     Is the patient using any DME equipment during OV? No     Depression Screenin/17/2024     9:08 AM 3/14/2024     1:33 PM   PHQ-9 Questionaire   Little interest or pleasure in doing things 0    Feeling down, depressed, or hopeless 0 0   PHQ-9 Total Score 0 0       Abuse Screenin/17/2024     9:00 AM 2024     2:00 PM 3/14/2024     1:00 PM   AMB Abuse Screening   Do you ever feel afraid of your partner?  N N   Are you in a relationship with someone who physically or mentally threatens you? N N N   Is it safe for you to go home? Y Y Y       Learning Assessment:  No question data found.    Fall Risk:      2024     2:21 PM   Fall Risk   2 or more falls in past year? yes   Fall with injury in past year? no           Coordination of Care:   1. \"Have you been to the ER, urgent care clinic since your last visit?  Hospitalized since your last visit?\" No     2. \"Have you seen or consulted any other health care providers outside of the [facility] since your last visit?\" Yes     3. For patients aged 45-75: Has the patient had a colonoscopy / FIT/ Cologuard? Not due     If the patient is female:    4. For patients aged 40-74: Has the patient had a mammogram within the past 2 years? Not due     5. For patients aged 21-65: Has the patient had a pap smear? Due     Health Maintenance: reviewed and discussed and ordered per Provider.    Health Maintenance Due   Topic Date Due    Hepatitis B vaccine (1 of 3 - 3-dose series) Never done    Varicella vaccine (1 of 2 - 2-dose childhood series) Never done    Diabetic foot exam  Never done    Diabetic retinal exam  Never done    DTaP/Tdap/Td vaccine (2 - Td or Tdap) 2023    COVID-19 Vaccine (3 - -24 season) 2023    Cervical cancer screen  2024

## 2024-07-17 NOTE — PROGRESS NOTES
Kalie López (:  1980) is a 44 y.o. female here for evaluation of the following chief complaint(s):  Abdominal Pain and Foot Pain        ASSESSMENT/PLAN:  1. Type 2 diabetes mellitus with diabetic polyneuropathy, with long-term current use of insulin ([facility])  Assessment & Plan:  - uncontrolled  - insulin pump is promising  - continue with endo   Orders:  -     AMB POC HEMOGLOBIN A1C  -     gabapentin (NEURONTIN) 100 MG capsule; Take 1 capsule by mouth 3 times daily for 60 days. Intended supply: 30 days Max Daily Amount: 300 mg, Disp-90 capsule, R-1Normal  2. Generalized abdominal pain  Assessment & Plan:  - chronic; large differential; possible IBS, GERD, food allergy, hernia  - belly labs  - trial of PPI, bentyl, and FODMAP diet (not simultaneously; pt to try each alone)  - RTC x 1 month   Orders:  -     Comprehensive Metabolic Panel; Future  -     Lipase; Future  -     omeprazole (PRILOSEC) 20 MG delayed release capsule; Take 1 capsule by mouth every morning (before breakfast), Disp-30 capsule, R-0Normal  -     dicyclomine (BENTYL) 10 MG capsule; Take 1 capsule by mouth 4 times daily (before meals and nightly), Disp-360 capsule, R-0Normal  3. Smoker  Assessment & Plan:  - reattempt cessation with nicotine replacement   Orders:  -     nicotine (NICODERM CQ) 21 MG/24HR; Place 1 patch onto the skin daily, Disp-42 patch, R-0Normal  -     nicotine polacrilex (NICORETTE) 2 MG gum; Take 1 each by mouth as needed for Smoking cessation, Disp-110 each, R-3Normal  4. Polyneuropathy associated with underlying disease ([facility])  Assessment & Plan:  - uncontrolled  - trial of gabapentin, will likely need to titrate up  - RTC x 1 month   Orders:  -     gabapentin (NEURONTIN) 100 MG capsule; Take 1 capsule by mouth 3 times daily for 60 days. Intended supply: 30 days Max Daily Amount: 300 mg, Disp-90 capsule, R-1Normal      Follow-up and Dispositions    Return in about 1 month (around 2024) for ABD pain, smoking.

## 2024-08-19 ENCOUNTER — OFFICE VISIT (OUTPATIENT)
Facility: CLINIC | Age: 44
End: 2024-08-19
Payer: MEDICAID

## 2024-08-19 VITALS
HEIGHT: 62 IN | RESPIRATION RATE: 13 BRPM | TEMPERATURE: 97.8 F | SYSTOLIC BLOOD PRESSURE: 118 MMHG | BODY MASS INDEX: 38.94 KG/M2 | OXYGEN SATURATION: 96 % | DIASTOLIC BLOOD PRESSURE: 82 MMHG | WEIGHT: 211.6 LBS | HEART RATE: 92 BPM

## 2024-08-19 DIAGNOSIS — E11.42 TYPE 2 DIABETES MELLITUS WITH DIABETIC POLYNEUROPATHY, WITH LONG-TERM CURRENT USE OF INSULIN (HCC): ICD-10-CM

## 2024-08-19 DIAGNOSIS — Z79.4 TYPE 2 DIABETES MELLITUS WITH DIABETIC POLYNEUROPATHY, WITH LONG-TERM CURRENT USE OF INSULIN (HCC): ICD-10-CM

## 2024-08-19 DIAGNOSIS — R10.84 GENERALIZED ABDOMINAL PAIN: Primary | ICD-10-CM

## 2024-08-19 PROCEDURE — 3052F HG A1C>EQUAL 8.0%<EQUAL 9.0%: CPT | Performed by: STUDENT IN AN ORGANIZED HEALTH CARE EDUCATION/TRAINING PROGRAM

## 2024-08-19 PROCEDURE — 99214 OFFICE O/P EST MOD 30 MIN: CPT | Performed by: STUDENT IN AN ORGANIZED HEALTH CARE EDUCATION/TRAINING PROGRAM

## 2024-08-19 PROCEDURE — G2211 COMPLEX E/M VISIT ADD ON: HCPCS | Performed by: STUDENT IN AN ORGANIZED HEALTH CARE EDUCATION/TRAINING PROGRAM

## 2024-08-19 NOTE — PROGRESS NOTES
Kalie López (:  1980) is a 44 y.o. female here for evaluation of the following chief complaint(s):  Follow-up        ASSESSMENT/PLAN:  1. Generalized abdominal pain  Assessment & Plan:  - possible hernia, adhesions  - refractory to bentyl, pepcid  - small subq mass in LLQ  - ref to gi   Orders:  -     External Referral To Gastroenterology  2. Type 2 diabetes mellitus with diabetic polyneuropathy, with long-term current use of insulin ([facility])  -      DIABETES FOOT EXAM          SUBJECTIVE/OBJECTIVE:  HPI  Smoking  - former 1PPD x 18 years; quit 2024   Update (24)  - has resumed smoking and would like aids to quit again  Update (24)  - down to max 3 cigs in a day and does not smoke daily    T2DM  - sees Amanda hargrove; last on 24; fu in April  - latest A1C around 8  - regular insulin 36U and SS, Lantus 10U, farxiga 10  Update (24)  - to see endo on ; more recent A1C is 10  - diet seems appropriate; taking prednisone  - has not seen opto; Dr. Al at [facility]  Update (24)  - saw endo; they are working on an insulin pump; f/u in July  - predisone (for CKD/DELORES) is a challenge  - reports moderate numbness/tingling in both feet x 6 months  Update (24)  - A1C 9.9 today; delvin has started her on an insulin pump  - reports parasthesias in feet x months  - now accepting neuro pain meds  Update (24)  - gabapentin is helpful    ABD pain  - intermittent generalized pain x   - worse when eating certain foods (fried)  - denies diarrhea, constipation, anorexia,   - has not tried any PPI  Update (24)  - neither prilosec nor bentyl helped  - pain mostly in LLQ; intermittent constipation  - hx of multiple abd procedures  - reports \"healthy poops\"      - [facility]; clinic has not sent us the requested records    ROS as stated above.    /82 (Site: Right Upper Arm, Position: Sitting, Cuff Size: Large

## 2024-08-19 NOTE — ASSESSMENT & PLAN NOTE
- possible hernia, adhesions  - refractory to bentyl, pepcid  - small subq mass in LLQ  - ref to gi

## 2024-08-19 NOTE — PROGRESS NOTES
Kalie López is a 44 y.o. year old female who presents today for   Chief Complaint   Patient presents with    Follow-up       Is someone accompanying this pt? No     Is the patient using any DME equipment during OV? No     Depression Screenin/17/2024     9:08 AM 3/14/2024     1:33 PM   PHQ-9 Questionaire   Little interest or pleasure in doing things 0    Feeling down, depressed, or hopeless 0 0   PHQ-9 Total Score 0 0       Abuse Screenin/17/2024     9:00 AM 2024     2:00 PM 3/14/2024     1:00 PM   AMB Abuse Screening   Do you ever feel afraid of your partner?  N N   Are you in a relationship with someone who physically or mentally threatens you? N N N   Is it safe for you to go home? Y Y Y       Learning Assessment:  No question data found.    Fall Risk:      2024     2:21 PM   Fall Risk   Do you feel unsteady or are you worried about falling?  yes   2 or more falls in past year? yes   Fall with injury in past year? no           Coordination of Care:   1. \"Have you been to the ER, urgent care clinic since your last visit?  Hospitalized since your last visit?\" No     2. \"Have you seen or consulted any other health care providers outside of the [facility] since your last visit?\" No     3. For patients aged 45-75: Has the patient had a colonoscopy / FIT/ Cologuard? Not due     If the patient is female:    4. For patients aged 40-74: Has the patient had a mammogram within the past 2 years? Not due     5. For patients aged 21-65: Has the patient had a pap smear? Due     Health Maintenance: reviewed and discussed and ordered per Provider.    Health Maintenance Due   Topic Date Due    Diabetic foot exam  Never done    Varicella vaccine (1 of 2 - 13+ 2-dose series) Never done    Diabetic retinal exam  Never done    Hepatitis B vaccine (1 of 3 - + 3-dose series) Never done    DTaP/Tdap/Td vaccine (2 - Td or Tdap) 2023    COVID-19 Vaccine (3 - -24 season) 2023

## 2024-08-30 ENCOUNTER — TELEPHONE (OUTPATIENT)
Age: 44
End: 2024-08-30

## 2024-08-30 NOTE — TELEPHONE ENCOUNTER
Referral for R10.84 (ICD-10-CM) - Generalized abdominal pain . Please review and advise.      Referral  Referral # [id number]  Referral Information    Referral # Creation Date Referral Status Status Update    [id number] 08/19/2024 Pending Review 08/30/2024: Status History     Status Reason Referral Type Referral Reasons Referral Class   Other Eval and Treat Specialty Services Required Internal     To Specialty To Provider To Location/Place of Service To Department   Gastroenterology Anupam Parra MD none none     To Vendor Referred By By Location/Place of Service By Department   none Jalen Mabry MD [facility]     Priority Start Date Expiration Date Referral Entered By   Routine 08/19/2024 02/15/2025 Jalen Mabry MD     Visits Requested Visits Authorized Visits Completed Visits Scheduled   2 2       Coverages    Payer Plan Auth. Required? Covered? Member # Authorized From Expires Auth # Precert. # Comment   [facility] COMM[employer] [facility]AN [facility] -- Covered [id number] 3/1/2024 -- -- -- --     Referral Information    Referral # Creation Date Referral Status Status Update    [id number] 08/19/2024 Pending Review 08/30/2024: Status History     Status Reason Referral Type Referral Reasons Referral Class   Other Eval and Treat Specialty Services Required Internal     To Specialty To Provider To Location/Place of Service To Department   Gastroenterology Anupam Parra MD none none     To Vendor Referred By By Location/Place of Service By Department   none Jalen Mabry MD [facility]     Priority Start Date Expiration Date Referral Entered By   Routine 08/19/2024 02/15/2025 Jalen Mabry MD     Visits Requested Visits Authorized Visits Completed Visits Scheduled   2 2       Procedure Information    Service Details  Procedure Modifiers Provider Requested Approved   none  0 0    REF25 - AMB REFERRAL TO GASTROENTEROLOGY none Anupam Parra MD 2 2     Diagnosis Information    Diagnosis   R10.84 (ICD-10-CM) - Generalized abdominal pain     Service-Level Authorizations    No service level authorizations were found.  Referral Notes  Number of Notes: 4  .  Type Date User Summary Attachment   Specialty Comments 08/30/2024  7:33 AM Babar Dimas Sent to provider to review. -   Note:  Sent to provider to review.   .  Type Date User Summary Attachment   General 08/29/2024 12:25 PM Tami Woods LPN Closest in network provider - Dr. Anupam Parra. Advised pt to call her office or call insurance for closer provider. -   Note:  Closest in network provider - Dr. Anupam Parra. Advised pt to call her office or call insurance for closer provider.   .  Type Date User Summary Attachment   General 08/26/2024 12:03 PM Tami Woods LPN [facility] -   Note:  [facility]  [address], [address]  [place] [address]  (P) [phone]  (F) [phone]  .  Type Date User Summary Attachment   Provider Comments 08/19/2024 10:30 AM Jalen Mabry MD Provider Comments -   Note:  The patient can be scheduled with any member of the group, including the provider with the first available appointments.   Referral Order    Order   External Referral To Gastroenterology (Order # [id number]) on 08/19/2024   View Encounter        Triage Information    Decision: (none)   Priority: Routine   Schedule by date: 9/18/2024     Scheduling Instructions           Service-Level Authorizations    No service level authorizations were found.

## 2024-09-04 DIAGNOSIS — R10.84 GENERALIZED ABDOMINAL PAIN: Primary | ICD-10-CM

## 2024-09-04 NOTE — PROGRESS NOTES
Ordering referral to requested GI clinic, Dr. Sanjeev Santiago.  Please fax referral as soon as possible and notify pt when it is complete.  She may not need to come in for the 9/5 appointment.     Thank you Tami for all of your hard work with this.

## 2024-09-05 ENCOUNTER — OFFICE VISIT (OUTPATIENT)
Facility: CLINIC | Age: 44
End: 2024-09-05

## 2024-09-05 VITALS
BODY MASS INDEX: 39.45 KG/M2 | DIASTOLIC BLOOD PRESSURE: 75 MMHG | HEART RATE: 87 BPM | HEIGHT: 62 IN | WEIGHT: 214.4 LBS | RESPIRATION RATE: 12 BRPM | SYSTOLIC BLOOD PRESSURE: 120 MMHG | OXYGEN SATURATION: 97 % | TEMPERATURE: 97 F

## 2024-09-05 DIAGNOSIS — J06.9 VIRAL UPPER RESPIRATORY TRACT INFECTION: ICD-10-CM

## 2024-09-05 DIAGNOSIS — J06.9 UPPER RESPIRATORY TRACT INFECTION, UNSPECIFIED TYPE: Primary | ICD-10-CM

## 2024-09-05 RX ORDER — PSEUDOEPHEDRINE HCL 60 MG
60 TABLET ORAL EVERY 6 HOURS PRN
Qty: 50 TABLET | Refills: 1 | Status: SHIPPED | OUTPATIENT
Start: 2024-09-05

## 2024-09-05 RX ORDER — OXYMETAZOLINE HYDROCHLORIDE 0.05 G/100ML
2 SPRAY NASAL 2 TIMES DAILY
Qty: 6 ML | Refills: 3 | Status: SHIPPED | OUTPATIENT
Start: 2024-09-05 | End: 2024-11-04

## 2024-09-05 RX ORDER — CODEINE PHOSPHATE AND GUAIFENESIN 10; 100 MG/5ML; MG/5ML
5 SOLUTION ORAL 3 TIMES DAILY PRN
Qty: 105 ML | Refills: 0 | Status: SHIPPED | OUTPATIENT
Start: 2024-09-05 | End: 2024-09-12

## 2024-09-05 NOTE — PROGRESS NOTES
Mouth: Mucous membranes are moist.      Pharynx: Posterior oropharyngeal erythema present. No oropharyngeal exudate.   Cardiovascular:      Rate and Rhythm: Normal rate and regular rhythm.      Pulses: Normal pulses.      Heart sounds: Normal heart sounds.      Comments: - no pedal edema  Pulmonary:      Effort: Pulmonary effort is normal.      Breath sounds: Normal breath sounds.   Musculoskeletal:      Cervical back: Tenderness present.   Lymphadenopathy:      Cervical: Cervical adenopathy present.   Neurological:      Mental Status: She is alert and oriented to person, place, and time. Mental status is at baseline.   Psychiatric:         Mood and Affect: Mood normal.               An electronic signature was used to authenticate this note.    --Jalen Mabry MD

## 2024-09-05 NOTE — PROGRESS NOTES
Vaccine (3 - 2023-24 season) 09/01/2024        -VIMAL Cook  [facility]  Phone: [phone]  Fax: [phone]

## 2024-09-12 ENCOUNTER — SCHEDULED TELEPHONE ENCOUNTER (OUTPATIENT)
Age: 44
End: 2024-09-12

## 2024-09-12 ENCOUNTER — TELEPHONE (OUTPATIENT)
Age: 44
End: 2024-09-12

## 2024-09-12 ENCOUNTER — PREP FOR PROCEDURE (OUTPATIENT)
Age: 44
End: 2024-09-12

## 2024-09-12 DIAGNOSIS — R10.84 GENERALIZED ABDOMINAL PAIN: Primary | ICD-10-CM

## 2024-09-12 DIAGNOSIS — R10.84 GENERALIZED ABDOMINAL PAIN: ICD-10-CM

## 2024-09-12 DIAGNOSIS — R10.13 EPIGASTRIC PAIN: Primary | ICD-10-CM

## 2024-09-12 RX ORDER — POLYETHYLENE GLYCOL 3350, SODIUM SULFATE ANHYDROUS, SODIUM BICARBONATE, SODIUM CHLORIDE, POTASSIUM CHLORIDE 236; 22.74; 6.74; 5.86; 2.97 G/4L; G/4L; G/4L; G/4L; G/4L
4000 POWDER, FOR SOLUTION ORAL DAILY
Qty: 4000 ML | Refills: 0 | Status: SHIPPED | OUTPATIENT
Start: 2024-09-12 | End: 2024-09-13

## 2024-09-17 ENCOUNTER — TELEPHONE (OUTPATIENT)
Age: 44
End: 2024-09-17

## 2024-10-16 ENCOUNTER — ANESTHESIA EVENT (OUTPATIENT)
Age: 44
End: 2024-10-16
Payer: MEDICAID

## 2024-10-16 ENCOUNTER — TELEPHONE (OUTPATIENT)
Age: 44
End: 2024-10-16

## 2024-10-16 RX ORDER — SODIUM CHLORIDE 0.9 % (FLUSH) 0.9 %
5-40 SYRINGE (ML) INJECTION EVERY 12 HOURS SCHEDULED
Status: CANCELLED | OUTPATIENT
Start: 2024-10-16

## 2024-10-16 RX ORDER — SODIUM CHLORIDE 9 MG/ML
INJECTION, SOLUTION INTRAVENOUS PRN
Status: CANCELLED | OUTPATIENT
Start: 2024-10-16

## 2024-10-16 RX ORDER — SODIUM CHLORIDE 0.9 % (FLUSH) 0.9 %
5-40 SYRINGE (ML) INJECTION PRN
Status: CANCELLED | OUTPATIENT
Start: 2024-10-16

## 2024-10-17 ENCOUNTER — CLINICAL DOCUMENTATION (OUTPATIENT)
Facility: CLINIC | Age: 44
End: 2024-10-17

## 2024-10-17 DIAGNOSIS — E11.42 TYPE 2 DIABETES MELLITUS WITH DIABETIC POLYNEUROPATHY, WITH LONG-TERM CURRENT USE OF INSULIN (HCC): Primary | ICD-10-CM

## 2024-10-17 DIAGNOSIS — Z79.4 TYPE 2 DIABETES MELLITUS WITH DIABETIC POLYNEUROPATHY, WITH LONG-TERM CURRENT USE OF INSULIN (HCC): Primary | ICD-10-CM

## 2024-10-17 DIAGNOSIS — J45.20 MILD INTERMITTENT ASTHMA WITHOUT COMPLICATION: ICD-10-CM

## 2024-10-17 NOTE — PROGRESS NOTES
GI requesting clearance for EGD, given DM and asthma.  Her asthma is well-controlled. I will request a RN visit for POC A1C and eval of asthma.  Pt notified through [facility].    Please call pt to schedule a RN visit. POC A1C placed.

## 2024-10-24 ENCOUNTER — OFFICE VISIT (OUTPATIENT)
Facility: CLINIC | Age: 44
End: 2024-10-24

## 2024-10-24 DIAGNOSIS — Z79.4 TYPE 2 DIABETES MELLITUS WITH DIABETIC POLYNEUROPATHY, WITH LONG-TERM CURRENT USE OF INSULIN (HCC): Primary | ICD-10-CM

## 2024-10-24 DIAGNOSIS — I20.89 ATYPICAL ANGINA (HCC): Primary | ICD-10-CM

## 2024-10-24 DIAGNOSIS — E11.42 TYPE 2 DIABETES MELLITUS WITH DIABETIC POLYNEUROPATHY, WITH LONG-TERM CURRENT USE OF INSULIN (HCC): Primary | ICD-10-CM

## 2024-10-24 LAB — HBA1C MFR BLD: 8 %

## 2024-10-24 RX ORDER — SODIUM CHLORIDE, SODIUM LACTATE, POTASSIUM CHLORIDE, CALCIUM CHLORIDE 600; 310; 30; 20 MG/100ML; MG/100ML; MG/100ML; MG/100ML
INJECTION, SOLUTION INTRAVENOUS CONTINUOUS
Status: CANCELLED | OUTPATIENT
Start: 2024-10-24

## 2024-10-24 NOTE — H&P
w/Niacinamide, (FIASP IJ), Inject as directed, Disp: , Rfl:     insulin glargine (LANTUS) 100 UNIT/ML injection vial, Inject 10 Units into the skin nightly, Disp: , Rfl:      Family history:  The patient has a family history of no known GI disease.    Social history :    Social Connections: Socially Isolated (1/10/2024)    Received from [facility], [facility]    Social Connection and Isolation Panel [NHANES]     Frequency of Communication with Friends and Family: Once a week     Frequency of Social Gatherings with Friends and Family: Never     Attends Anabaptism Services: Never     Active Member of Clubs or Organizations: No     Attends Club or Organization Meetings: Never     Marital Status: Never         ROS:   Review of Systems - negative     Vital signs:  Ht 1.575 m (5' 2\")   Wt 94.3 kg (208 lb)   LMP  (LMP Unknown) Comment: Ablation  BMI 38.04 kg/m²     PE: Healthy appearing female  HEENT: Normocephalic atraumatic.  No oral abnormalities.  Lungs: Clear to auscultation percussion.  Heart: Regular rate and rhythm without murmur rub or gallop.  Abdomen: Normal bowel sounds, soft nontender without hepatosplenomegaly or masses.  Extremities: No peripheral edema.  Neuro: No distinct abnormalities.    Impression:  1.  Abdominal pain.  The patient is a healthy female that is stable and here for evaluation of abdominal pain via EGD and colonoscopy.      Recommendations:  1.  EGD and colonoscopy with MAC.  The risks, benefits, adverse reactions including death and the possibility of missed lesions were neoplasia were discussed in detail today with the patient prior to the procedure.  They understand and agreed to undergo the procedure.  2.  Further recommendations pending their clinical course.  3.  Followup as needed.

## 2024-10-25 RX ORDER — FAMOTIDINE 40 MG/1
40 TABLET, FILM COATED ORAL DAILY
Qty: 30 TABLET | Refills: 1 | Status: SHIPPED | OUTPATIENT
Start: 2024-10-25

## 2024-10-25 NOTE — TELEPHONE ENCOUNTER
Medication(s) requesting:   Requested Prescriptions     Pending Prescriptions Disp Refills    famotidine (PEPCID) 40 MG tablet 30 tablet 1     Sig: Take 1 tablet by mouth daily        Last office visit:  10/17/24  Next office visit DMA: 11/19/2024

## 2024-10-29 ENCOUNTER — HOSPITAL ENCOUNTER (OUTPATIENT)
Age: 44
Setting detail: OUTPATIENT SURGERY
Discharge: HOME | End: 2024-10-29
Attending: INTERNAL MEDICINE | Admitting: INTERNAL MEDICINE
Payer: MEDICAID

## 2024-10-29 ENCOUNTER — ANESTHESIA (OUTPATIENT)
Age: 44
End: 2024-10-29
Payer: MEDICAID

## 2024-10-29 VITALS
SYSTOLIC BLOOD PRESSURE: 118 MMHG | HEART RATE: 100 BPM | DIASTOLIC BLOOD PRESSURE: 74 MMHG | WEIGHT: 208 LBS | HEIGHT: 62 IN | RESPIRATION RATE: 16 BRPM | TEMPERATURE: 97.3 F | BODY MASS INDEX: 38.28 KG/M2 | OXYGEN SATURATION: 99 %

## 2024-10-29 DIAGNOSIS — R10.84 GENERALIZED ABDOMINAL PAIN: ICD-10-CM

## 2024-10-29 DIAGNOSIS — R10.13 EPIGASTRIC PAIN: ICD-10-CM

## 2024-10-29 LAB
CHP ED QC CHECK: NORMAL
GLUCOSE BLD-MCNC: 165 MG/DL
HCG UR QL: NEGATIVE

## 2024-10-29 PROCEDURE — 7100000011 HC PHASE II RECOVERY - ADDTL 15 MIN: Performed by: INTERNAL MEDICINE

## 2024-10-29 PROCEDURE — 2580000003 HC RX 258: Performed by: NURSE ANESTHETIST, CERTIFIED REGISTERED

## 2024-10-29 PROCEDURE — 3700000000 HC ANESTHESIA ATTENDED CARE: Performed by: INTERNAL MEDICINE

## 2024-10-29 PROCEDURE — 3600007513: Performed by: INTERNAL MEDICINE

## 2024-10-29 PROCEDURE — 6360000002 HC RX W HCPCS: Performed by: NURSE ANESTHETIST, CERTIFIED REGISTERED

## 2024-10-29 PROCEDURE — 2709999900 HC NON-CHARGEABLE SUPPLY: Performed by: INTERNAL MEDICINE

## 2024-10-29 PROCEDURE — 45378 DIAGNOSTIC COLONOSCOPY: CPT | Performed by: INTERNAL MEDICINE

## 2024-10-29 PROCEDURE — 43235 EGD DIAGNOSTIC BRUSH WASH: CPT | Performed by: INTERNAL MEDICINE

## 2024-10-29 PROCEDURE — 3600007503: Performed by: INTERNAL MEDICINE

## 2024-10-29 PROCEDURE — 81025 URINE PREGNANCY TEST: CPT

## 2024-10-29 PROCEDURE — 3700000001 HC ADD 15 MINUTES (ANESTHESIA): Performed by: INTERNAL MEDICINE

## 2024-10-29 PROCEDURE — 7100000010 HC PHASE II RECOVERY - FIRST 15 MIN: Performed by: INTERNAL MEDICINE

## 2024-10-29 RX ORDER — SODIUM CHLORIDE 0.9 % (FLUSH) 0.9 %
5-40 SYRINGE (ML) INJECTION PRN
Status: DISCONTINUED | OUTPATIENT
Start: 2024-10-29 | End: 2024-10-29 | Stop reason: HOSPADM

## 2024-10-29 RX ORDER — GLYCOPYRROLATE 0.2 MG/ML
INJECTION INTRAMUSCULAR; INTRAVENOUS
Status: DISCONTINUED | OUTPATIENT
Start: 2024-10-29 | End: 2024-10-29 | Stop reason: SDUPTHER

## 2024-10-29 RX ORDER — SODIUM CHLORIDE, SODIUM LACTATE, POTASSIUM CHLORIDE, CALCIUM CHLORIDE 600; 310; 30; 20 MG/100ML; MG/100ML; MG/100ML; MG/100ML
INJECTION, SOLUTION INTRAVENOUS CONTINUOUS
Status: DISCONTINUED | OUTPATIENT
Start: 2024-10-29 | End: 2024-10-29 | Stop reason: HOSPADM

## 2024-10-29 RX ORDER — PROPOFOL 10 MG/ML
INJECTION, EMULSION INTRAVENOUS
Status: DISCONTINUED | OUTPATIENT
Start: 2024-10-29 | End: 2024-10-29 | Stop reason: SDUPTHER

## 2024-10-29 RX ADMIN — SODIUM CHLORIDE, POTASSIUM CHLORIDE, SODIUM LACTATE AND CALCIUM CHLORIDE: 600; 310; 30; 20 INJECTION, SOLUTION INTRAVENOUS at 09:00

## 2024-10-29 RX ADMIN — PROPOFOL 50 MG: 10 INJECTION, EMULSION INTRAVENOUS at 10:07

## 2024-10-29 RX ADMIN — PROPOFOL 30 MG: 10 INJECTION, EMULSION INTRAVENOUS at 10:04

## 2024-10-29 RX ADMIN — PROPOFOL 50 MG: 10 INJECTION, EMULSION INTRAVENOUS at 10:19

## 2024-10-29 RX ADMIN — PROPOFOL 30 MG: 10 INJECTION, EMULSION INTRAVENOUS at 10:08

## 2024-10-29 RX ADMIN — GLYCOPYRROLATE 0.2 MG: 0.2 INJECTION INTRAMUSCULAR; INTRAVENOUS at 09:52

## 2024-10-29 RX ADMIN — PROPOFOL 50 MG: 10 INJECTION, EMULSION INTRAVENOUS at 10:13

## 2024-10-29 RX ADMIN — PROPOFOL 100 MG: 10 INJECTION, EMULSION INTRAVENOUS at 09:59

## 2024-10-29 RX ADMIN — PROPOFOL 50 MG: 10 INJECTION, EMULSION INTRAVENOUS at 10:05

## 2024-10-29 RX ADMIN — PROPOFOL 30 MG: 10 INJECTION, EMULSION INTRAVENOUS at 10:10

## 2024-10-29 RX ADMIN — PROPOFOL 30 MG: 10 INJECTION, EMULSION INTRAVENOUS at 10:15

## 2024-10-29 RX ADMIN — PROPOFOL 100 MG: 10 INJECTION, EMULSION INTRAVENOUS at 10:02

## 2024-10-29 RX ADMIN — PROPOFOL 30 MG: 10 INJECTION, EMULSION INTRAVENOUS at 10:17

## 2024-10-29 RX ADMIN — PROPOFOL 50 MG: 10 INJECTION, EMULSION INTRAVENOUS at 10:11

## 2024-10-29 RX ADMIN — SODIUM CHLORIDE, POTASSIUM CHLORIDE, SODIUM LACTATE AND CALCIUM CHLORIDE: 600; 310; 30; 20 INJECTION, SOLUTION INTRAVENOUS at 08:31

## 2024-10-29 ASSESSMENT — COPD QUESTIONNAIRES: CAT_SEVERITY: NO INTERVAL CHANGE

## 2024-10-29 ASSESSMENT — PAIN - FUNCTIONAL ASSESSMENT
PAIN_FUNCTIONAL_ASSESSMENT: NONE - DENIES PAIN
PAIN_FUNCTIONAL_ASSESSMENT: NONE - DENIES PAIN

## 2024-10-29 NOTE — DISCHARGE INSTRUCTIONS
Impression:  1.  Normal upper endoscopy.  No upper GI etiology explain the patient's abdominal pain     Recommendations:    1.  Continue present therapy.    2.  Follow up as needed.  3.  Proceed with colonoscopy.    Impression:  Normal colonoscopy to cecum.  However all I can say is there were no obstructing masses because the bowel preparation was poor.  Suboptimal bowel preparation.  Therefore, cannot exclude lesions and polyps were retained stool remained.  I suspect her abdominal pain is actually related to prior  with a neuroma or granuloma causing her pain at or near the belt line.  No polyps or masses were seen.     Recommendations:  Repeat colonoscopy in 1 years for screening  due to suboptimal bowel preparation.  Recommend repeat colonoscopy sooner if symptoms occur (rectal bleeding, positive Cologuard testing, heme positive stool, change in bowel habits, other).   2.   Follow up as needed.  3.  Recommend CT scan of the abdomen and pelvis with IV and oral contrast at the patient has continued complaints of abdominal pain.

## 2024-10-29 NOTE — OP NOTE
bulb and descending duodenum were then evaluated and found to be normal including no masses, ulcers, or or mucosal abnormalities.      SPECIMENS: None    ESTIMATED BLOOD LOSS:  None.    COMPLICATIONS:  None     COMMENTS: none    Impression:  1.  Normal upper endoscopy.  No upper GI etiology explain the patient's abdominal pain      Recommendations:    1.  Continue present therapy.    2.  Follow up as needed.  3.  Proceed with colonoscopy.    Anupam Parra M.D.      Colonoscopy procedure note    Date of service: 10/29/2024    Type: Diagnostic     Indication for procedure: Abdominal pain    Anesthesia classification: ASA class 2    Patient history and physical been accomplished and documented.  Patient is assessed and determined to be appropriate candidate for planned procedure and sedation; patient reassessed immediately prior to sedation.      Sedation plan: MAC per anesthesia    Surgical assistant: Not applicable    Informed consent obtained: Yes.  he indication, risks including but not limited to bleeding, perforation, infection, death, and potential failure to visual areas are diagnosed neoplasia, alternatives and benefits were discussed with the patient prior to the procedure.  Patient identity and procedure was verified, absent was obtained, and is consistent with the consent form found in the patient's records.    PROCEDURE PERFORMED:  COLONOSCOPY  to the cecum with MAC     INSTRUMENT: Olympus colonoscope per nursing notes.    FINDINGS:    External anal lesions: Normal   Rectum: normal. Rectal sphincter tone was normal.  Retroflexion view: Normal  Sigmoid: normal   Descending Colon: normal   Transverse Colon: normal   Ascending Colon: normal   Cecum: normal, including the ileocecal valve.    Terminal ileum: not evaluated     Specimens: None    Bowel preparation- Inadequate to detect small (5mm) polyps or larger.  The bowel preparation was terrible the poor proximal of the colon that you went.  I could

## 2024-10-29 NOTE — ANESTHESIA POSTPROCEDURE EVALUATION
Department of Anesthesiology  Postprocedure Note    Patient: Kalie López  MRN: [id number]  YOB: 1980  Date of evaluation: 10/29/2024    Procedure Summary       Date: 10/29/24 Room / Location: [facility] ENDO 01 / [facility] ENDOSCOPY    Anesthesia Start: 0953 Anesthesia Stop: 1022    Procedures:       EGD (Upper GI Region)      COLONOSCOPY (Lower GI Region) Diagnosis:       Generalized abdominal pain      (Generalized abdominal pain [R10.84])    Surgeons: Anupam Parra MD Responsible Provider: Lakshmi Adams APRN - CRNA    Anesthesia Type: MAC ASA Status: 3            Anesthesia Type: MAC    Arpita Phase I: Arpita Score: 10    Arpita Phase II:      Anesthesia Post Evaluation    Patient location during evaluation: bedside  Level of consciousness: sleepy but conscious  Pain score: 0  Airway patency: patent  Nausea & Vomiting: no nausea and no vomiting  Cardiovascular status: blood pressure returned to baseline  Respiratory status: acceptable  Hydration status: euvolemic  Pain management: adequate    No notable events documented.  
I have personally performed a face to face diagnostic evaluation on this patient. I have reviewed the ACP note and agree with the history, exam and plan of care, except as noted.

## 2024-10-29 NOTE — INTERVAL H&P NOTE
Update History & Physical    The patient's History and Physical of October 29, 2024 was reviewed with the patient and I examined the patient. There was no change. The surgical site was confirmed by the patient and me.     Plan: The risks, benefits, expected outcome, and alternative to the recommended procedure have been discussed with the patient. Patient understands and wants to proceed with the procedure.     Electronically signed by Anupam Parra MD on 10/29/2024 at 9:22 AM

## 2024-10-29 NOTE — ANESTHESIA PRE PROCEDURE
Readings from Last 3 Encounters:   10/29/24 132/80   09/05/24 120/75   08/19/24 118/82       NPO Status: Time of last liquid consumption: 2330                        Time of last solid consumption: 1015                        Date of last liquid consumption: 10/28/24                        Date of last solid food consumption: 10/28/24    BMI:   Wt Readings from Last 3 Encounters:   10/15/24 94.3 kg (208 lb)   09/05/24 97.3 kg (214 lb 6.4 oz)   08/19/24 96 kg (211 lb 9.6 oz)     Body mass index is 38.04 kg/m².    CBC:   Lab Results   Component Value Date/Time    WBC 12.3 04/17/2024 05:16 PM    RBC 4.05 04/17/2024 05:16 PM    HGB 12.3 04/17/2024 05:16 PM    HCT 39.3 04/17/2024 05:16 PM    MCV 97.0 04/17/2024 05:16 PM    RDW 55.4 04/17/2024 05:16 PM     04/17/2024 05:16 PM       CMP:   Lab Results   Component Value Date/Time     07/17/2024 11:06 AM    K 4.5 07/17/2024 11:06 AM     07/17/2024 11:06 AM    CO2 26 07/17/2024 11:06 AM    BUN 23 07/17/2024 11:06 AM    CREATININE 1.04 07/17/2024 11:06 AM    GFRAA >60.0 04/17/2024 05:16 PM    LABGLOM 68 07/17/2024 11:06 AM    GLUCOSE 114 07/17/2024 11:06 AM    GLUCOSE 121 04/17/2024 05:16 PM    CALCIUM 9.5 07/17/2024 11:06 AM    BILITOT 0.5 07/17/2024 11:06 AM    ALKPHOS 73 07/17/2024 11:06 AM    ALKPHOS 69 04/17/2024 05:16 PM    AST 25 07/17/2024 11:06 AM    ALT 32 07/17/2024 11:06 AM       POC Tests:   Recent Labs     10/29/24  0630   POCGLU 165       Coags: No results found for: \"PROTIME\", \"INR\", \"APTT\"    HCG (If Applicable):   Lab Results   Component Value Date    PREGTESTUR Negative 10/29/2024    PREGSERUM NEGATIVE 08/05/2021        ABGs: No results found for: \"PHART\", \"PO2ART\", \"XHP9EDM\", \"YVC6DDY\", \"BEART\", \"I2PXYOHR\"     Type & Screen (If Applicable):  No results found for: \"ABORH\", \"LABANTI\"    Drug/Infectious Status (If Applicable):  Lab Results   Component Value Date/Time    HEPCAB Non Reactive 03/14/2024 02:17 PM       COVID-19 Screening (If

## 2024-11-11 ENCOUNTER — OFFICE VISIT (OUTPATIENT)
Age: 44
End: 2024-11-11
Payer: MEDICAID

## 2024-11-11 VITALS
TEMPERATURE: 97.6 F | HEART RATE: 97 BPM | HEIGHT: 62 IN | OXYGEN SATURATION: 95 % | DIASTOLIC BLOOD PRESSURE: 80 MMHG | WEIGHT: 218.2 LBS | BODY MASS INDEX: 40.15 KG/M2 | SYSTOLIC BLOOD PRESSURE: 128 MMHG

## 2024-11-11 DIAGNOSIS — G47.33 OBSTRUCTIVE SLEEP APNEA: ICD-10-CM

## 2024-11-11 DIAGNOSIS — E11.9 TYPE 2 DIABETES MELLITUS WITHOUT COMPLICATION, WITHOUT LONG-TERM CURRENT USE OF INSULIN (HCC): ICD-10-CM

## 2024-11-11 DIAGNOSIS — E66.812 CLASS 2 OBESITY DUE TO EXCESS CALORIES WITHOUT SERIOUS COMORBIDITY WITH BODY MASS INDEX (BMI) OF 38.0 TO 38.9 IN ADULT: ICD-10-CM

## 2024-11-11 DIAGNOSIS — R94.31 ABNORMAL ECG: ICD-10-CM

## 2024-11-11 DIAGNOSIS — Z72.0 TOBACCO ABUSE: ICD-10-CM

## 2024-11-11 DIAGNOSIS — R07.89 OTHER CHEST PAIN: Primary | ICD-10-CM

## 2024-11-11 DIAGNOSIS — I25.10 CORONARY ARTERY DISEASE INVOLVING NATIVE CORONARY ARTERY OF NATIVE HEART WITHOUT ANGINA PECTORIS: ICD-10-CM

## 2024-11-11 DIAGNOSIS — I10 PRIMARY HYPERTENSION: ICD-10-CM

## 2024-11-11 DIAGNOSIS — E78.00 HYPERCHOLESTEREMIA: ICD-10-CM

## 2024-11-11 DIAGNOSIS — R01.1 SYSTOLIC MURMUR: ICD-10-CM

## 2024-11-11 DIAGNOSIS — E66.09 CLASS 2 OBESITY DUE TO EXCESS CALORIES WITHOUT SERIOUS COMORBIDITY WITH BODY MASS INDEX (BMI) OF 38.0 TO 38.9 IN ADULT: ICD-10-CM

## 2024-11-11 PROCEDURE — 99215 OFFICE O/P EST HI 40 MIN: CPT | Performed by: INTERNAL MEDICINE

## 2024-11-11 PROCEDURE — 3079F DIAST BP 80-89 MM HG: CPT | Performed by: INTERNAL MEDICINE

## 2024-11-11 PROCEDURE — 3074F SYST BP LT 130 MM HG: CPT | Performed by: INTERNAL MEDICINE

## 2024-11-11 PROCEDURE — 3052F HG A1C>EQUAL 8.0%<EQUAL 9.0%: CPT | Performed by: INTERNAL MEDICINE

## 2024-11-11 RX ORDER — METOCLOPRAMIDE 10 MG/1
TABLET ORAL
COMMUNITY
End: 2024-11-11

## 2024-11-11 RX ORDER — BUMETANIDE 1 MG/1
1 TABLET ORAL EVERY OTHER DAY
COMMUNITY

## 2024-11-11 RX ORDER — POLYETHYLENE GLYCOL 3350, SODIUM SULFATE ANHYDROUS, SODIUM BICARBONATE, SODIUM CHLORIDE, POTASSIUM CHLORIDE 236; 22.74; 6.74; 5.86; 2.97 G/4L; G/4L; G/4L; G/4L; G/4L
POWDER, FOR SOLUTION ORAL
COMMUNITY
Start: 2024-09-13 | End: 2024-11-11

## 2024-11-11 RX ORDER — METOPROLOL TARTRATE 50 MG
50 TABLET ORAL 2 TIMES DAILY
COMMUNITY
Start: 2024-08-15

## 2024-11-11 RX ORDER — BISACODYL 5 MG/1
TABLET, DELAYED RELEASE ORAL
COMMUNITY
End: 2024-11-11

## 2024-11-11 RX ORDER — DULAGLUTIDE 3 MG/.5ML
INJECTION, SOLUTION SUBCUTANEOUS
COMMUNITY
End: 2024-11-11

## 2024-11-11 RX ORDER — MIRTAZAPINE 15 MG/1
TABLET, FILM COATED ORAL
COMMUNITY
Start: 2024-10-25

## 2024-11-11 RX ORDER — TACROLIMUS 1 MG/1
CAPSULE ORAL
COMMUNITY
End: 2024-11-11

## 2024-11-11 RX ORDER — NAPROXEN 500 MG/1
TABLET ORAL
COMMUNITY
End: 2024-11-11

## 2024-11-11 RX ORDER — DULAGLUTIDE 1.5 MG/.5ML
INJECTION, SOLUTION SUBCUTANEOUS
COMMUNITY
Start: 2024-10-21 | End: 2024-11-11

## 2024-11-11 RX ORDER — CYCLOBENZAPRINE HCL 5 MG
TABLET ORAL
COMMUNITY

## 2024-11-11 RX ORDER — INSULIN LISPRO 100 [IU]/ML
INJECTION, SOLUTION INTRAVENOUS; SUBCUTANEOUS
COMMUNITY

## 2024-11-11 RX ORDER — MIDODRINE HYDROCHLORIDE 10 MG/1
10 TABLET ORAL
COMMUNITY
Start: 2024-01-26 | End: 2024-11-11

## 2024-11-11 RX ORDER — BUMETANIDE 2 MG/1
2 TABLET ORAL 2 TIMES DAILY
COMMUNITY
Start: 2023-12-30 | End: 2024-11-11

## 2024-11-11 RX ORDER — METFORMIN HYDROCHLORIDE 500 MG/1
2 TABLET, EXTENDED RELEASE ORAL 2 TIMES DAILY
COMMUNITY
End: 2024-11-11

## 2024-11-11 RX ORDER — GLIPIZIDE 10 MG/1
10 TABLET ORAL
COMMUNITY
End: 2024-11-11

## 2024-11-11 RX ORDER — LISINOPRIL 10 MG/1
TABLET ORAL
COMMUNITY
End: 2024-11-11

## 2024-11-11 RX ORDER — ONDANSETRON 4 MG/1
TABLET, ORALLY DISINTEGRATING ORAL
COMMUNITY
End: 2024-11-11

## 2024-11-11 RX ORDER — NYSTATIN 100000 U/G
OINTMENT TOPICAL 2 TIMES DAILY
COMMUNITY
End: 2024-11-11

## 2024-11-11 RX ORDER — LINACLOTIDE 290 UG/1
CAPSULE, GELATIN COATED ORAL
COMMUNITY
End: 2024-11-11

## 2024-11-11 RX ORDER — PREDNISONE 5 MG/1
15 TABLET ORAL DAILY
COMMUNITY
Start: 2024-08-15

## 2024-11-11 RX ORDER — BENZONATATE 100 MG/1
CAPSULE ORAL
COMMUNITY

## 2024-11-11 RX ORDER — GLUCAGON 3 MG/1
POWDER NASAL
COMMUNITY
End: 2024-11-11

## 2024-11-11 RX ORDER — POTASSIUM CHLORIDE 1500 MG/1
TABLET, EXTENDED RELEASE ORAL
COMMUNITY
Start: 2024-08-15

## 2024-11-11 ASSESSMENT — PATIENT HEALTH QUESTIONNAIRE - PHQ9
SUM OF ALL RESPONSES TO PHQ QUESTIONS 1-9: 0
SUM OF ALL RESPONSES TO PHQ QUESTIONS 1-9: 0
2. FEELING DOWN, DEPRESSED OR HOPELESS: NOT AT ALL
SUM OF ALL RESPONSES TO PHQ9 QUESTIONS 1 & 2: 0
SUM OF ALL RESPONSES TO PHQ QUESTIONS 1-9: 0
SUM OF ALL RESPONSES TO PHQ QUESTIONS 1-9: 0
1. LITTLE INTEREST OR PLEASURE IN DOING THINGS: NOT AT ALL

## 2024-11-11 ASSESSMENT — ENCOUNTER SYMPTOMS
GASTROINTESTINAL NEGATIVE: 1
EYES NEGATIVE: 1
ALLERGIC/IMMUNOLOGIC NEGATIVE: 1
SHORTNESS OF BREATH: 1

## 2024-11-11 NOTE — PROGRESS NOTES
Kalie López (:  1980) is a 44 y.o. female,Established patient, here for evaluation of the following chief complaint(s):  6 Month Follow-Up    Subjective   SUBJECTIVE/OBJECTIVE:   History of Present Illness  The patient presents for evaluation of multiple medical concerns. She states her history dates back to 2023 when she presented to the emergency room with chest discomfort and an elevated blood pressure.  Heart rate was elevated as well.  Patient has been under a great deal of stress and states she has been fatigued more recently.  She is diabetic and has hyperlipidemia but has no prior history of hypertension.     She experiences intermittent chest pain, approximately twice a week, described as sharp and central. The pain is not associated with any specific triggers. She continues to smoke, although less frequently due to the associated chest discomfort. Additionally, she reports persistent shortness of breath, which she attributes to her smoking habit. Despite these symptoms, she was previously engaging in physical exercise 2 to 3 times a week but has since stopped due to fatigue. She has experienced weight fluctuation, losing 4 pounds and then gaining 6.    She has been diagnosed with sleep apnea but has not yet received any treatment.    She has a history of type 2 diabetes, which was previously uncontrolled. Her last A1c level was 8. She is currently managing her diabetes with an insulin pump. Her diet primarily consists of rice and vegetables, with occasional meat consumption.    SOCIAL HISTORY  She still smokes.  I have carefully reviewed all available medical records, previous office notes, lab, x-ray and procedure reports    Past Medical History:   Diagnosis Date    Asthma     CAD (coronary artery disease)     CHF (congestive heart failure) (HCC)     Chronic pelvic pain in female     Diabetes mellitus (HCC)     Kidney disease         Past Surgical History:   Procedure

## 2024-11-11 NOTE — PROGRESS NOTES
1. \"Have you been to the ER, urgent care clinic since your last visit?  Hospitalized since your last visit?\" Reviewed by Dr. Maciej Katz    2. \"Have you seen or consulted any other health care providers outside of the [facility] since your last visit?\" Reviewed by Dr. Maciej Katz

## 2024-11-19 ENCOUNTER — OFFICE VISIT (OUTPATIENT)
Facility: CLINIC | Age: 44
End: 2024-11-19

## 2024-11-19 VITALS
BODY MASS INDEX: 38.76 KG/M2 | RESPIRATION RATE: 14 BRPM | TEMPERATURE: 97.9 F | DIASTOLIC BLOOD PRESSURE: 75 MMHG | OXYGEN SATURATION: 98 % | HEART RATE: 97 BPM | HEIGHT: 62 IN | WEIGHT: 210.6 LBS | SYSTOLIC BLOOD PRESSURE: 117 MMHG

## 2024-11-19 DIAGNOSIS — E11.42 TYPE 2 DIABETES MELLITUS WITH DIABETIC POLYNEUROPATHY, WITH LONG-TERM CURRENT USE OF INSULIN (HCC): ICD-10-CM

## 2024-11-19 DIAGNOSIS — G89.29 CHRONIC PAIN OF BOTH KNEES: Primary | ICD-10-CM

## 2024-11-19 DIAGNOSIS — M25.561 CHRONIC PAIN OF BOTH KNEES: Primary | ICD-10-CM

## 2024-11-19 DIAGNOSIS — Z79.4 TYPE 2 DIABETES MELLITUS WITH DIABETIC POLYNEUROPATHY, WITH LONG-TERM CURRENT USE OF INSULIN (HCC): ICD-10-CM

## 2024-11-19 DIAGNOSIS — N05.0 MINIMAL CHANGE DISEASE: ICD-10-CM

## 2024-11-19 DIAGNOSIS — M25.562 CHRONIC PAIN OF BOTH KNEES: Primary | ICD-10-CM

## 2024-11-19 DIAGNOSIS — E78.00 PURE HYPERCHOLESTEROLEMIA: ICD-10-CM

## 2024-11-19 NOTE — PROGRESS NOTES
Kalie López is a 44 y.o. year old female who presents today for   Chief Complaint   Patient presents with    Follow-up        \"Have you been to the ER, urgent care clinic since your last visit?  Hospitalized since your last visit?\"   no Where:     When:    Reason:      “Have you seen or consulted any other health care providers outside our system since your last visit?”   NO      “Have you had a pap smear?”    NO    Date of last Cervical Cancer screen (HPV or PAP): 5/20/2021       “Have you had a diabetic eye exam?”    Yes     No diabetic eye exam on file           So Isaacs CMA  [facility]  Ph: [phone]  Fax: [phone]

## 2024-11-19 NOTE — PROGRESS NOTES
Kalie López (:  1980) is a 44 y.o. female here for evaluation of the following chief complaint(s):  Follow-up        ASSESSMENT/PLAN:  1. Chronic pain of both knees  Assessment & Plan:  - likely OA  - tylenol and voltaren gel  - ortho eval   Orders:  -     [facility] [place] ([facility])  -     diclofenac sodium (VOLTAREN) 1 % GEL; Apply 4 g topically 4 times daily To knees and any other joints that hurt, Topical, 4 TIMES DAILY Starting Tue 2024, Disp-150 g, R-3, Normal  2. Type 2 diabetes mellitus with diabetic polyneuropathy, with long-term current use of insulin ([facility])  Assessment & Plan:  - A1C 8.0, but only taking insulin  - pt will resume farxiga   - A1C in 3 months  Orders:  -     Hemoglobin A1C; Future  3. Minimal change disease  Assessment & Plan:  - pt to resume losartan at 25mg (nephro suggested 50 which she never started)  - continue to hold bumex; no pedal edema  - pt plans to notify nephro about the medication changes, since they believe she is compliant with losrtan, bumex, and farxiga   Orders:  -     Basic Metabolic Panel; Future  -     Phosphorus; Future  4. Pure hypercholesterolemia  Assessment & Plan:  - noncompliant; pt plans to resume lipitor  - check lipids in 3 months   Orders:  -     Lipid Panel; Future      Follow-up and Dispositions    Return in about 3 months (around 2025) for Annual.         SUBJECTIVE/OBJECTIVE:  HPI  T2DM  - sees Amanda hargrove; last on 24; fu in April  - latest A1C around 8  - regular insulin 36U and SS, Lantus 10U, farxiga 10  Update (24)  - to see endo on ; more recent A1C is 10  - diet seems appropriate; taking prednisone  - has not seen opto; Dr. Al at [facility] in [facility]  Update (24)  - saw [facility]; they are working on an insulin pump; f/u in July  - predisone (for CKD/DELORES) is a challenge  - reports moderate numbness/tingling in both feet x 6 months  Update

## 2024-11-19 NOTE — ASSESSMENT & PLAN NOTE
- pt to resume losartan at 25mg (nephro suggested 50 which she never started)  - continue to hold bumex; no pedal edema  - pt plans to notify nephro about the medication changes, since they believe she is compliant with losrtan, bumex, and farxiga

## 2024-12-03 ENCOUNTER — OFFICE VISIT (OUTPATIENT)
Age: 44
End: 2024-12-03
Payer: MEDICAID

## 2024-12-03 VITALS — HEIGHT: 62 IN | WEIGHT: 218 LBS | BODY MASS INDEX: 40.12 KG/M2

## 2024-12-03 DIAGNOSIS — M17.11 PRIMARY OSTEOARTHRITIS OF RIGHT KNEE: Primary | ICD-10-CM

## 2024-12-03 DIAGNOSIS — M76.51 PATELLAR TENDINITIS OF BOTH KNEES: ICD-10-CM

## 2024-12-03 DIAGNOSIS — M22.8X2 PATELLAR MALTRACKING, LEFT: ICD-10-CM

## 2024-12-03 DIAGNOSIS — M22.8X1 PATELLAR MALTRACKING, RIGHT: ICD-10-CM

## 2024-12-03 DIAGNOSIS — M76.52 PATELLAR TENDINITIS OF BOTH KNEES: ICD-10-CM

## 2024-12-03 DIAGNOSIS — M17.12 PRIMARY OSTEOARTHRITIS OF LEFT KNEE: ICD-10-CM

## 2024-12-03 PROCEDURE — 99204 OFFICE O/P NEW MOD 45 MIN: CPT

## 2024-12-03 PROCEDURE — 73564 X-RAY EXAM KNEE 4 OR MORE: CPT

## 2024-12-03 NOTE — PROGRESS NOTES
Patient: Kalie López                MRN: [id number]       SSN: [id number]  YOB: 1980        AGE: 44 y.o.        SEX: female  BMI: Body mass index is 39.87 kg/m².    PCP: Jalen Mabry MD  12/03/24    Chief Complaint: Knee Pain      1. Primary osteoarthritis of right knee  -     AMB POC XRAY, KNEE; COMPLETE, 4+ VIEW  2. Primary osteoarthritis of left knee  -     AMB POC XRAY, KNEE; COMPLETE, 4+ VIEW  3. Patellar maltracking, right  -     BS - [facility]  -     DME Order for (Specify) as OP  4. Patellar maltracking, left  -     BS - [facility]  5. Patellar tendinitis of both knees  -     BS - [facility]        HPI:  Kalie López is a 44 y.o. female with chief complaint of   Chief Complaint   Patient presents with    Knee Pain       Patient presents today with bilateral knee pain. They report that their pain has been present for over a year.  She reports that her right knee hurts more than her left and will occasionally give out on her.  She denies any falls or injuries but does state that she has a sharp aching pain that is at the front of her knees and is worse with going up and down stairs.  She states that she is constantly experiencing aching in her knees. Was given tylenol and voltaren gel by her PCP and referred to orthopedics.  She denies trying physical therapy or injections.    They have a past medical history of Smoker (using nicoderm), Diabetes + polyneuropathy (A1c 8) - (only on insulin (pump), restarted farxiga recently, followed by endo), CAD (hx of NSTEMI,  Dr. Katz CARDIO), Hypertension, Asthma/ COPD, MDD, Obesity (38), and Minimal change disease (nephrology) (prednisone, metolazone, K supplements.)    IMAGING:  Imaging read by myself and interpreted as follows:    December 3, 2024:  4 view x-ray of the bilateral knees including

## 2024-12-17 ENCOUNTER — HOSPITAL ENCOUNTER (OUTPATIENT)
Facility: HOSPITAL | Age: 44
Setting detail: RECURRING SERIES
Discharge: HOME | End: 2024-12-20
Payer: MEDICAID

## 2024-12-17 PROCEDURE — 97530 THERAPEUTIC ACTIVITIES: CPT

## 2024-12-17 PROCEDURE — 97161 PT EVAL LOW COMPLEX 20 MIN: CPT

## 2024-12-17 NOTE — PROGRESS NOTES
steps with reciprocal pattern without pain to increase ease of entry into home  Status at last note/certification: prefers step to pattern, increased pain steps   - Goal: Patient will increase LEFS score to at least 33 pts to demonstrate increased functional mobility.  Status at last note/certification: LEFS 24 pts       Frequency / Duration: Patient would benefit from skilled PT 2 times per week for up to 20 sessions as needed in this certification period.  Goals will be assigned and reassessed every 10 visits/ 30 days per guidelines .    Patient/ Caregiver education and instruction: Diagnosis, prognosis, self care, activity modification, and exercises [x]  Plan of care has been reviewed with PTA    Adan Milton, PT       12/17/2024       1:48 PM  ===================================================================  I certify that the above Therapy Services are being furnished while the patient is under my care. I agree with the treatment plan and certify that this therapy is necessary.    Physician's Signature:_________________________   DATE:_________   TIME:________                           Cristina Maradiaga PA-C    ** Signature, Date and Time must be completed for valid certification **  [facility] or you may fax the signed copy to [phone].  Thank you.

## 2024-12-17 NOTE — PROGRESS NOTES
[x]  Continue plan of care  []  Update interventions per flow sheet       []  Other:_      Adan Milton, PT 12/17/2024  12:03 PM

## 2025-01-03 ENCOUNTER — HOSPITAL ENCOUNTER (OUTPATIENT)
Facility: HOSPITAL | Age: 45
Setting detail: RECURRING SERIES
Discharge: HOME | End: 2025-01-06
Payer: MEDICAID

## 2025-01-03 PROCEDURE — 97530 THERAPEUTIC ACTIVITIES: CPT

## 2025-01-03 PROCEDURE — 97112 NEUROMUSCULAR REEDUCATION: CPT

## 2025-01-03 PROCEDURE — 97116 GAIT TRAINING THERAPY: CPT

## 2025-01-03 PROCEDURE — 97110 THERAPEUTIC EXERCISES: CPT

## 2025-01-03 NOTE — PROGRESS NOTES
PHYSICAL / OCCUPATIONAL THERAPY - DAILY TREATMENT NOTE    Patient Name: Kalie López    Date: 1/3/2025    : 1980  Insurance: Payor: [facility] PL / Plan: [employer] [place]ID [employer] [place] / Product Type: *No Product type* /      Patient  verified Yes     Visit #   Current / Total 2 10   Time   In / Out 2:20 pm 3:02 pm   Pain   In / Out 7/10 5/10   Subjective Functional Status/Changes: Patient notes lack of HEP compliance at this time. Minimal change reported.     TREATMENT AREA =  Pain in left knee [M25.562]  Pain in right knee [M25.561]     OBJECTIVE  Therapeutic Procedures:  Tx Min Procedure, Rationale, Specifics   11 32213 Therapeutic Exercise (timed):  increase ROM, strength, coordination, balance, and proprioception to improve patient's ability to progress to PLOF and address remaining functional goals. (see flow sheet as applicable)     Details if applicable:       12 80565 Neuromuscular Re-Education (timed):  improve balance, coordination, kinesthetic sense, posture, core stability and proprioception to improve patient's ability to develop conscious control of individual muscles and awareness of position of extremities in order to progress to PLOF and address remaining functional goals. (see flow sheet as applicable)     Details if applicable:       11 63952 Therapeutic Activity (timed):  use of dynamic activities replicating functional movements to increase ROM, strength, coordination, balance, and proprioception in order to improve patient's ability to progress to PLOF and address remaining functional goals.  (see flow sheet as applicable)     Details if applicable:       8 21921 Gait Training (timed):    2 x 40 feet with no AD (assistive device) over level surfaces with supervision level of assist. Cuing for increased (B) hip ER/ABD during stance phases to minimize genu valgus and for increased knee flexion at loading response to reduce heavy weight shifting/impact.  To

## 2025-01-10 ENCOUNTER — OFFICE VISIT (OUTPATIENT)
Age: 45
End: 2025-01-10
Payer: MEDICAID

## 2025-01-10 ENCOUNTER — HOSPITAL ENCOUNTER (OUTPATIENT)
Facility: HOSPITAL | Age: 45
Setting detail: RECURRING SERIES
Discharge: HOME | End: 2025-01-13
Payer: MEDICAID

## 2025-01-10 VITALS
RESPIRATION RATE: 18 BRPM | DIASTOLIC BLOOD PRESSURE: 78 MMHG | BODY MASS INDEX: 38.45 KG/M2 | WEIGHT: 217 LBS | HEART RATE: 90 BPM | SYSTOLIC BLOOD PRESSURE: 116 MMHG | TEMPERATURE: 97 F | HEIGHT: 63 IN | OXYGEN SATURATION: 99 %

## 2025-01-10 DIAGNOSIS — I25.118 CORONARY ARTERY DISEASE OF NATIVE HEART WITH STABLE ANGINA PECTORIS, UNSPECIFIED VESSEL OR LESION TYPE (HCC): ICD-10-CM

## 2025-01-10 DIAGNOSIS — F17.200 SMOKER: ICD-10-CM

## 2025-01-10 DIAGNOSIS — I50.30 DIASTOLIC CONGESTIVE HEART FAILURE, UNSPECIFIED HF CHRONICITY (HCC): ICD-10-CM

## 2025-01-10 DIAGNOSIS — J42 CHRONIC BRONCHITIS, UNSPECIFIED CHRONIC BRONCHITIS TYPE (HCC): ICD-10-CM

## 2025-01-10 DIAGNOSIS — G47.33 OSA (OBSTRUCTIVE SLEEP APNEA): Primary | ICD-10-CM

## 2025-01-10 DIAGNOSIS — I10 HYPERTENSION, UNSPECIFIED TYPE: ICD-10-CM

## 2025-01-10 PROCEDURE — 97530 THERAPEUTIC ACTIVITIES: CPT

## 2025-01-10 PROCEDURE — 97110 THERAPEUTIC EXERCISES: CPT

## 2025-01-10 PROCEDURE — 3074F SYST BP LT 130 MM HG: CPT | Performed by: INTERNAL MEDICINE

## 2025-01-10 PROCEDURE — 99205 OFFICE O/P NEW HI 60 MIN: CPT | Performed by: INTERNAL MEDICINE

## 2025-01-10 PROCEDURE — 97112 NEUROMUSCULAR REEDUCATION: CPT

## 2025-01-10 PROCEDURE — 3078F DIAST BP <80 MM HG: CPT | Performed by: INTERNAL MEDICINE

## 2025-01-10 ASSESSMENT — SLEEP AND FATIGUE QUESTIONNAIRES
ESS TOTAL SCORE: 15
HOW LIKELY ARE YOU TO NOD OFF OR FALL ASLEEP WHILE SITTING INACTIVE IN A PUBLIC PLACE: MODERATE CHANCE OF DOZING
HOW LIKELY ARE YOU TO NOD OFF OR FALL ASLEEP WHILE SITTING AND TALKING TO SOMEONE: SLIGHT CHANCE OF DOZING
HOW LIKELY ARE YOU TO NOD OFF OR FALL ASLEEP WHILE WATCHING TV: MODERATE CHANCE OF DOZING
HOW LIKELY ARE YOU TO NOD OFF OR FALL ASLEEP WHILE SITTING QUIETLY AFTER LUNCH WITHOUT ALCOHOL: MODERATE CHANCE OF DOZING
HOW LIKELY ARE YOU TO NOD OFF OR FALL ASLEEP IN A CAR, WHILE STOPPED FOR A FEW MINUTES IN TRAFFIC: SLIGHT CHANCE OF DOZING
HOW LIKELY ARE YOU TO NOD OFF OR FALL ASLEEP WHILE SITTING AND READING: MODERATE CHANCE OF DOZING
HOW LIKELY ARE YOU TO NOD OFF OR FALL ASLEEP WHILE LYING DOWN TO REST IN THE AFTERNOON WHEN CIRCUMSTANCES PERMIT: HIGH CHANCE OF DOZING
HOW LIKELY ARE YOU TO NOD OFF OR FALL ASLEEP WHEN YOU ARE A PASSENGER IN A CAR FOR AN HOUR WITHOUT A BREAK: MODERATE CHANCE OF DOZING

## 2025-01-10 ASSESSMENT — PATIENT HEALTH QUESTIONNAIRE - PHQ9
SUM OF ALL RESPONSES TO PHQ QUESTIONS 1-9: 2
SUM OF ALL RESPONSES TO PHQ QUESTIONS 1-9: 2
2. FEELING DOWN, DEPRESSED OR HOPELESS: MORE THAN HALF THE DAYS
SUM OF ALL RESPONSES TO PHQ QUESTIONS 1-9: 2
SUM OF ALL RESPONSES TO PHQ9 QUESTIONS 1 & 2: 2
SUM OF ALL RESPONSES TO PHQ QUESTIONS 1-9: 2
1. LITTLE INTEREST OR PLEASURE IN DOING THINGS: NOT AT ALL

## 2025-01-10 NOTE — PROGRESS NOTES
plaque along the  proximal segment resulting in minimal focal stenosis (less than 25%).    RAMUS INTERMEDIUS (RI): The ramus intermedius is a small caliber vessel with no  evidence of atherosclerotic plaque or stenosis.      LEFT CIRCUMFLEX (LCX): The LCX is a medium caliber co-dominant vessel. There is  no evidence of atherosclerotic plaque or stenosis.    RIGHT CORONARY ARTERY (RCA): The RCA is a medium caliber co-dominant vessel. It  has a conventional origin from the right coronary sinus. There is no evidence of  atherosclerotic plaque or stenosis.      CARDIAC STRUCTURES:  There is normal atrioventricular and ventriculo-arterial concordance. Cardiac  chamber sizes are normal. No pericardial effusion, calcification or thickening.      NON-CARDIAC STRUCTURES:    Imaged lungs, mediastinum, pulmonary artery, thoracic aorta, osseous structures  and chest wall are within normal limits.    Impression  IMPRESSION:    1. Minimal focal stenosis (<25%) of the LAD proximal segment secondary to  calcified atherosclerotic plaque. Acute Coronary Syndrome(ACS) is highly  unlikely. Management recommendation: Consider evaluation of non-ACS etiology, if  normal troponin and no ECG changes. Also, consider referral for outpatient  follow-up for preventative therapy and risk factor modification.    2. Acquired images reveal no extra-cardiac pathology.    *Guideline-directed care per ACC Stable Ischemic Heart Disease Guidelines (Fihn  SD et al. JACC 2012;60:e44-164).    **This test is performed for the purposes of CHD detection and risk assessment  on a patient who has concerning symptoms or who has a greater than low CHD  pretest risk.    Please feel free to contact me at [phone]), if you have any  questions or would like to discuss this case further.        Electronically signed by: Leland Bradshaw MD 4/18/2024 1:04 PM EDT  Workstation ID: [id number]        EKG Results for orders placed or performed during

## 2025-01-10 NOTE — PROGRESS NOTES
LOB to improve safety with obstacle negotiation and curb ascent/descent.  Status at last note/certification: SLS right 19 seconds, left 12 seconds  - Goal: Patient will ascend and descend 12 steps with reciprocal pattern without pain to increase ease of entry into home  Status at last note/certification: prefers step to pattern, increased pain steps   - Goal: Patient will increase LEFS score to at least 33 pts to demonstrate increased functional mobility.  Status at last note/certification: LEFS 24 pts         Next PN/RC due 1/17/25  Authorization due (visit number/date) EDUARDO     PLAN  [x] Continue plan of care  [x]  Upgrade activities as tolerated  []  Discharge due to :  []  Other:    Adan Milton, PT    1/10/2025    7:09 AM    If an interpreting service was utilized for treatment of this patient, the contents of this document represent the material reviewed with the patient via the .     Future Appointments   Date Time Provider Department Center   1/10/2025  8:30 AM Yessica Evans MD [facility]   1/10/2025  2:20 PM Adan Milton, PT [facility]   1/15/2025  4:00 PM Mark Contreras, PTA [facility]   1/17/2025 10:20 AM Mark Contreras, PTA [facility]   1/22/2025 12:20 PM Adan Milton, PT [facility]   1/24/2025 11:40 AM Adan Milton, PT [facility]   1/29/2025 12:20 PM Adan Milton, PT [facility]   1/31/2025 11:40 AM Adan Milton, PT [facility]   2/5/2025 10:20 AM Adan Milton, PT [facility]   2/7/2025  9:40 AM Stephanie Caputo, PT [facility]   2/12/2025 11:00 AM Adan Milton, PT [facility]   2/14/2025 11:40 AM Adan Milton, PT [facility]   2/24/2025 10:00 AM Jalen Mabry MD [facility]   2/28/2025  1:30 PM Cristina Maradiaga PA-C [facility]   6/24/2025 11:15 AM Maciej Katz Sr., MD [facility]

## 2025-01-10 NOTE — PATIENT INSTRUCTIONS
Patient Education        Learning About Sleeping Well  What does sleeping well mean?     Sleeping well means getting enough sleep to feel good and stay healthy. How much sleep is enough varies among people.  The number of hours you sleep and how you feel when you wake up are both important. If you do not feel refreshed, you probably need more sleep. Another sign of not getting enough sleep is feeling tired during the day.  Experts recommend that adults get at least 7 or more hours of sleep per day. Children and older adults need more sleep.  Why is getting enough sleep important?  Getting enough quality sleep is a basic part of good health. When your sleep suffers, your physical health, mood, and your thoughts can suffer too. You may find yourself feeling more grumpy or stressed. Not getting enough sleep also can lead to serious problems, including injury, accidents, anxiety, and depression.  What might cause poor sleeping?  Many things can cause sleep problems, including:  Changes to your sleep schedule.  Stress. Stress can be caused by fear about a single event, such as giving a speech. Or you may have ongoing stress, such as worry about work or school.  Depression, anxiety, and other mental or emotional conditions.  Changes in your sleep habits or surroundings. This includes changes that happen where you sleep, such as noise, light, or sleeping in a different bed. It also includes changes in your sleep pattern, such as having jet lag or working a late shift.  Health problems, such as pain, breathing problems, and restless legs syndrome.  Lack of regular exercise.  Using alcohol, nicotine, or caffeine before bed.  How can you help yourself?  Here are some tips that may help you sleep more soundly and wake up feeling more refreshed.  Your sleeping area   Use your bedroom only for sleeping and sex. A bit of light reading may help you fall asleep. But if it doesn't, do your reading elsewhere in the house. Try not to

## 2025-01-15 ENCOUNTER — HOSPITAL ENCOUNTER (OUTPATIENT)
Facility: HOSPITAL | Age: 45
Setting detail: RECURRING SERIES
Discharge: HOME | End: 2025-01-18
Payer: MEDICAID

## 2025-01-15 PROCEDURE — 97110 THERAPEUTIC EXERCISES: CPT

## 2025-01-15 PROCEDURE — 97112 NEUROMUSCULAR REEDUCATION: CPT

## 2025-01-15 PROCEDURE — 97530 THERAPEUTIC ACTIVITIES: CPT

## 2025-01-15 NOTE — PROGRESS NOTES
PHYSICAL / OCCUPATIONAL THERAPY - DAILY TREATMENT NOTE    Patient Name: Kalie López    Date: 1/15/2025    : 1980  Insurance: Payor: [facility] PL / Plan: [employer] MEDICAID [employer] [place] / Product Type: *No Product type* /      Patient  verified Yes     Visit #   Current / Total 4 10   Time   In / Out 4:00 pm 4:40 pm   Pain   In / Out 0/10 0/10   Subjective Functional Status/Changes: Patient denies pain today.     TREATMENT AREA =  Pain in left knee [M25.562]  Pain in right knee [M25.561]     OBJECTIVE  PD modalities.    Therapeutic Procedures:  Tx Min Procedure, Rationale, Specifics   10 43241 Therapeutic Exercise (timed):  increase ROM, strength, coordination, balance, and proprioception to improve patient's ability to progress to PLOF and address remaining functional goals. (see flow sheet as applicable)     Details if applicable:       14 78879 Neuromuscular Re-Education (timed):  improve balance, coordination, kinesthetic sense, posture, core stability and proprioception to improve patient's ability to develop conscious control of individual muscles and awareness of position of extremities in order to progress to PLOF and address remaining functional goals. (see flow sheet as applicable)     Details if applicable:  flores step over     16 10243 Therapeutic Activity (timed):  use of dynamic activities replicating functional movements to increase ROM, strength, coordination, balance, and proprioception in order to improve patient's ability to progress to PLOF and address remaining functional goals.  (see flow sheet as applicable)     Details if applicable:  squatting, bridging, step ups, leg press     40 [facility] Totals Reminder: bill using total billable min of TIMED therapeutic procedures (example: do not include dry needle or estim unattended, both untimed codes, in totals to left)  8-22 min = 1 unit; 23-37 min = 2 units; 38-52 min = 3 units; 53-67 min = 4 units; 68-82 min =

## 2025-01-17 ENCOUNTER — HOSPITAL ENCOUNTER (OUTPATIENT)
Facility: HOSPITAL | Age: 45
Setting detail: RECURRING SERIES
End: 2025-01-17
Payer: MEDICAID

## 2025-01-22 ENCOUNTER — APPOINTMENT (OUTPATIENT)
Facility: HOSPITAL | Age: 45
End: 2025-01-22
Payer: MEDICAID

## 2025-01-24 ENCOUNTER — HOSPITAL ENCOUNTER (OUTPATIENT)
Facility: HOSPITAL | Age: 45
Setting detail: RECURRING SERIES
Discharge: HOME | End: 2025-01-27
Payer: MEDICAID

## 2025-01-29 ENCOUNTER — HOSPITAL ENCOUNTER (OUTPATIENT)
Facility: HOSPITAL | Age: 45
Setting detail: RECURRING SERIES
Discharge: HOME | End: 2025-02-01
Payer: MEDICAID

## 2025-01-29 ENCOUNTER — HOSPITAL ENCOUNTER (OUTPATIENT)
Dept: SLEEP MEDICINE | Facility: HOSPITAL | Age: 45
Discharge: HOME | End: 2025-02-01
Attending: INTERNAL MEDICINE
Payer: MEDICAID

## 2025-01-29 DIAGNOSIS — J42 CHRONIC BRONCHITIS, UNSPECIFIED CHRONIC BRONCHITIS TYPE (HCC): ICD-10-CM

## 2025-01-29 DIAGNOSIS — I50.30 DIASTOLIC CONGESTIVE HEART FAILURE, UNSPECIFIED HF CHRONICITY (HCC): ICD-10-CM

## 2025-01-29 DIAGNOSIS — G47.33 OSA (OBSTRUCTIVE SLEEP APNEA): ICD-10-CM

## 2025-01-29 DIAGNOSIS — I25.118 CORONARY ARTERY DISEASE OF NATIVE HEART WITH STABLE ANGINA PECTORIS, UNSPECIFIED VESSEL OR LESION TYPE (HCC): ICD-10-CM

## 2025-01-29 PROCEDURE — 97530 THERAPEUTIC ACTIVITIES: CPT | Performed by: PHYSICAL THERAPIST

## 2025-01-29 PROCEDURE — 95811 POLYSOM 6/>YRS CPAP 4/> PARM: CPT

## 2025-01-29 PROCEDURE — 97110 THERAPEUTIC EXERCISES: CPT | Performed by: PHYSICAL THERAPIST

## 2025-01-29 PROCEDURE — 97112 NEUROMUSCULAR REEDUCATION: CPT | Performed by: PHYSICAL THERAPIST

## 2025-01-29 NOTE — PROGRESS NOTES
[facility] [place]   [address] [place] [address]  Phone: [phone] Fax: [phone]  PROGRESS NOTE  Patient Name: Kalie López : 1980   Treatment/Medical Diagnosis: Pain in left knee [M25.562]  Pain in right knee [M25.561]   Referral Source:  Payor Cristina Maradiaga PA-C  Payor: [facility] PL / Plan: UHC MEDICAID COMMUNITY HEALTH [facility] [place] / Product Type: *No Product type* /      Date of Initial Visit: 24 Attended Visits: 5 Missed Visits: 3     SUMMARY OF TREATMENT  Treatment consist of therapeutic exercise including ROM, stretching, strengthening, Therapeutic activities, NM re-education, postural ed, patient education, HEP instruction.  CURRENT STATUS  Patient has attended 5 therapy sessions and is making good progress, despite having only attended 5 sessions since 2024. Pt is being seen for Zachariah knee pain.  Subjective: Patient reports 80% improvements in sx since SOC.  Pain levels range from 0 to 7.   Patient's current complaints: \"going up and down stairs, and arising from prolonged sitting \".  Patient performing HEP ~3x/wk. \" Only clams and bridges\"  Objective:   Knee AAROM:  Right Knee: 0-130 deg  Left Knee 0-135 deg   Patellar Mobility:  Right- hyper  Left- hyper     Strength:    Right (/5) Left (/5)   Hip     Flexion 5 5             Abduction 4+ 4             Extension 4+ 4+   Knee   Extension 5 5              Flexion 5 5   Ankle   Dorsiflexion 5 5      Functional Squat:   Increased wt shift to left LE  30\"STS- 11 repetitions, improved from 9   SLS (R) 12 seconds, (L) 30 seconds  SLR: no quad lag   SB bridge: good height and stability   Stair Negotiation:  reciprocal.  Decreased right knee eccentric control observed with descent  Functional gains: less frequent pain, not constant pain. No sleep disturbances.   Functional Deficits:  pain with stairs, and STS. Mild pain with squatting, walking

## 2025-01-29 NOTE — PROGRESS NOTES
PHYSICAL / OCCUPATIONAL THERAPY - DAILY TREATMENT NOTE    Patient Name: Kalie López    Date: 2025    : 1980  Insurance: Payor: [facility] PL / Plan: UHC MEDICAID COMMUNITY HEALTH [facility] [place] / Product Type: *No Product type* /      Patient  verified Yes     Visit #   Current / Total 5 10   Time   In / Out 1220pm  100pm   Pain   In / Out 7 0   Subjective Functional Status/Changes: See PN     TREATMENT AREA =  Pain in left knee [M25.562]  Pain in right knee [M25.561]     OBJECTIVE  PD modalities.    Therapeutic Procedures:  Tx Min Procedure, Rationale, Specifics   10 45181 Therapeutic Exercise (timed):  increase ROM, strength, coordination, balance, and proprioception to improve patient's ability to progress to PLOF and address remaining functional goals. (see flow sheet as applicable)     Details if applicable:       15 93052 Neuromuscular Re-Education (timed):  improve balance, coordination, kinesthetic sense, posture, core stability and proprioception to improve patient's ability to develop conscious control of individual muscles and awareness of position of extremities in order to progress to PLOF and address remaining functional goals. (see flow sheet as applicable)     Details if applicable:  eccentric control activities, SLR     15 00448 Therapeutic Activity (timed):  use of dynamic activities replicating functional movements to increase ROM, strength, coordination, balance, and proprioception in order to improve patient's ability to progress to PLOF and address remaining functional goals.  (see flow sheet as applicable)     Details if applicable:  squatting, SB bridging, leg press, PN     40 [facility] Totals Reminder: bill using total billable min of TIMED therapeutic procedures (example: do not include dry needle or estim unattended, both untimed codes, in totals to left)  8-22 min = 1 unit; 23-37 min = 2 units; 38-52 min = 3 units; 53-67 min = 4 units; 68-82 min = 5 units

## 2025-01-30 ENCOUNTER — OFFICE VISIT (OUTPATIENT)
Age: 45
End: 2025-01-30

## 2025-01-30 VITALS
DIASTOLIC BLOOD PRESSURE: 73 MMHG | HEART RATE: 96 BPM | HEIGHT: 63 IN | WEIGHT: 213.38 LBS | BODY MASS INDEX: 37.81 KG/M2 | SYSTOLIC BLOOD PRESSURE: 124 MMHG

## 2025-01-30 DIAGNOSIS — M25.512 CHRONIC LEFT SHOULDER PAIN: Primary | ICD-10-CM

## 2025-01-30 DIAGNOSIS — M75.102 TEAR OF LEFT ROTATOR CUFF, UNSPECIFIED TEAR EXTENT, UNSPECIFIED WHETHER TRAUMATIC: ICD-10-CM

## 2025-01-30 DIAGNOSIS — G89.29 CHRONIC LEFT SHOULDER PAIN: Primary | ICD-10-CM

## 2025-01-30 RX ORDER — TRIAMCINOLONE ACETONIDE 40 MG/ML
40 INJECTION, SUSPENSION INTRA-ARTICULAR; INTRAMUSCULAR ONCE
Status: COMPLETED | OUTPATIENT
Start: 2025-01-30 | End: 2025-01-30

## 2025-01-30 RX ADMIN — TRIAMCINOLONE ACETONIDE 40 MG: 40 INJECTION, SUSPENSION INTRA-ARTICULAR; INTRAMUSCULAR at 09:59

## 2025-01-30 ASSESSMENT — SLEEP AND FATIGUE QUESTIONNAIRES
HOW LIKELY ARE YOU TO NOD OFF OR FALL ASLEEP IN A CAR, WHILE STOPPED FOR A FEW MINUTES IN TRAFFIC: WOULD NEVER DOZE
HOW LIKELY ARE YOU TO NOD OFF OR FALL ASLEEP WHILE SITTING QUIETLY AFTER LUNCH WITHOUT ALCOHOL: MODERATE CHANCE OF DOZING
ESS TOTAL SCORE: 14
HOW LIKELY ARE YOU TO NOD OFF OR FALL ASLEEP WHILE SITTING AND READING: MODERATE CHANCE OF DOZING
HOW LIKELY ARE YOU TO NOD OFF OR FALL ASLEEP WHILE WATCHING TV: HIGH CHANCE OF DOZING
HOW LIKELY ARE YOU TO NOD OFF OR FALL ASLEEP WHILE SITTING AND TALKING TO SOMEONE: SLIGHT CHANCE OF DOZING
HOW LIKELY ARE YOU TO NOD OFF OR FALL ASLEEP WHILE SITTING INACTIVE IN A PUBLIC PLACE: MODERATE CHANCE OF DOZING
HOW LIKELY ARE YOU TO NOD OFF OR FALL ASLEEP WHEN YOU ARE A PASSENGER IN A CAR FOR AN HOUR WITHOUT A BREAK: SLIGHT CHANCE OF DOZING
HOW LIKELY ARE YOU TO NOD OFF OR FALL ASLEEP WHILE LYING DOWN TO REST IN THE AFTERNOON WHEN CIRCUMSTANCES PERMIT: HIGH CHANCE OF DOZING

## 2025-01-30 NOTE — PROGRESS NOTES
Kalie López  1980   Chief Complaint   Patient presents with    Shoulder Pain     left        HISTORY OF PRESENT ILLNESS  Kalie López is a 44 y.o. female who presents today for evaluation of left shoulder pain.  Pain is a 8/10. Pain has been present for a year. She notes she fell out of a hospital bed a year ago. She feels as if her shoulder goes out of place. Having increased pain at night time. She has decent mobility. Pain increases with movement such as overhead and reaching. She is unable to take Nsaids. She uses voltaren cream without relief.     Has tried following treatments: Injections:No; Brace:No; Therapy:No; Cane/Crutch:No      Allergies   Allergen Reactions    Empagliflozin Other (See Comments)     Yeast infections    Glimepiride Hives    Nsaids Other (See Comments)    Shellfish Allergy Hives, Itching and Other (See Comments)     SOB    Shellfish-Derived Products Hives and Other (See Comments)    Methocarbamol Rash, Hives, Itching and Swelling     Took Robaxin & tramadol together    Oxycodone-Acetaminophen Itching, Other (See Comments), Hives and Rash     Rash and very itchy    Tramadol Rash, Hives, Other (See Comments) and Swelling     Took Robaxin & tramadol together        Past Medical History:   Diagnosis Date    CAD (coronary artery disease)     CHF (congestive heart failure) (HCC)     Chronic pelvic pain in female     Kidney disease       Social History       Tobacco History       Smoking Status  Every Day Smoking Start Date  4/3/1995 Current Packs/Day  0.2 packs/day Average Packs/Day  1 pack/day for 29.8 years (29.4 ttl pk-yrs) Smoking Tobacco Type  Cigarettes since 4/3/1995   Pack Year History     Packs/Day From To Years    0.15 8/2/2024  0.5     8/1/2024 8/2/2024 0.0    1 4/3/1995 8/1/2024 29.3      Smokeless Tobacco Use  Never              Alcohol History       Alcohol Use Status  Not Currently Drinks/Week  3 Standard drinks or equivalent per week Amount  2.5

## 2025-01-31 ENCOUNTER — HOSPITAL ENCOUNTER (OUTPATIENT)
Facility: HOSPITAL | Age: 45
Setting detail: RECURRING SERIES
End: 2025-01-31
Payer: MEDICAID

## 2025-01-31 PROCEDURE — 97530 THERAPEUTIC ACTIVITIES: CPT

## 2025-01-31 PROCEDURE — 97110 THERAPEUTIC EXERCISES: CPT

## 2025-01-31 PROCEDURE — 97112 NEUROMUSCULAR REEDUCATION: CPT

## 2025-01-31 PROCEDURE — 97116 GAIT TRAINING THERAPY: CPT

## 2025-01-31 NOTE — PROGRESS NOTES
PHYSICAL / OCCUPATIONAL THERAPY - DAILY TREATMENT NOTE    Patient Name: Kalie López    Date: 2025    : 1980  Insurance: Payor: [facility] PL / Plan: [employer]HC MEDICAID [employer] [place] / Product Type: *No Product type* /      Patient  verified Yes     Visit #   Current / Total 1 2-4   Time   In / Out 11:40 am 12:16 pm   Pain   In / Out 3/10 0/10   Subjective Functional Status/Changes: \"It's not too bad today.\"     TREATMENT AREA =  Pain in left knee [M25.562]  Pain in right knee [M25.561]     OBJECTIVE    Therapeutic Procedures:  Tx Min Procedure, Rationale, Specifics   8 05454 Therapeutic Exercise (timed):  increase ROM, strength, coordination, balance, and proprioception to improve patient's ability to progress to PLOF and address remaining functional goals. (see flow sheet as applicable)     Details if applicable:  strengthening     8 64684 Neuromuscular Re-Education (timed):  improve balance, coordination, kinesthetic sense, posture, core stability and proprioception to improve patient's ability to develop conscious control of individual muscles and awareness of position of extremities in order to progress to PLOF and address remaining functional goals. (see flow sheet as applicable)     Details if applicable:  balance, gluteal re-education     12 33270 Therapeutic Activity (timed):  use of dynamic activities replicating functional movements to increase ROM, strength, coordination, balance, and proprioception in order to improve patient's ability to progress to PLOF and address remaining functional goals.  (see flow sheet as applicable)     Details if applicable:  bridge, squat, step up     8 94115 Gait Training (timed):    2 x 40 feet with no AD (assistive device) over level surfaces with S/SBA level of assist. Cuing for maintenance of upright posture.  To improve safety and dynamic movement with household/community ambulation.  (see flow sheet as applicable)     Banded

## 2025-02-05 ENCOUNTER — HOSPITAL ENCOUNTER (OUTPATIENT)
Facility: HOSPITAL | Age: 45
Setting detail: RECURRING SERIES
End: 2025-02-05
Payer: MEDICAID

## 2025-02-06 ENCOUNTER — CLINICAL DOCUMENTATION (OUTPATIENT)
Age: 45
End: 2025-02-06

## 2025-02-07 ENCOUNTER — HOSPITAL ENCOUNTER (OUTPATIENT)
Facility: HOSPITAL | Age: 45
Setting detail: RECURRING SERIES
Discharge: HOME | End: 2025-02-10
Payer: MEDICAID

## 2025-02-07 PROCEDURE — 97116 GAIT TRAINING THERAPY: CPT

## 2025-02-07 PROCEDURE — 97530 THERAPEUTIC ACTIVITIES: CPT

## 2025-02-07 PROCEDURE — 97110 THERAPEUTIC EXERCISES: CPT

## 2025-02-07 PROCEDURE — 97112 NEUROMUSCULAR REEDUCATION: CPT

## 2025-02-07 NOTE — PROGRESS NOTES
PHYSICAL / OCCUPATIONAL THERAPY - DAILY TREATMENT NOTE (updated )    Patient Name: Kalie López    Date: 2025    : 1980  Insurance: Payor: [facility] PL / Plan: [employer]HC MEDICAID [employer] [place] / Product Type: *No Product type* /      Patient  verified yes     Visit #   Current / Total 2 4 Total Time   Time   In / Out 9:40 10:24 44   Pain   In / Out 3 0    Subjective Functional Status/Changes: I feel like my knees have been feeling better.      TREATMENT AREA =  Pain in left knee [M25.562]  Pain in right knee [M25.561]    OBJECTIVE    Therapeutic Procedures:  44  Total   BC Totals Reminder: bill using total billable min of TIMED therapeutic procedures (example: do not include dry needle or estim unattended, both untimed codes, in totals to left)  8-22 min = 1 unit; 23-37 min = 2 units; 38-52 min = 3 units; 53-67 min = 4 units; 68-82 min = 5 units   Tx Min  Procedure, Rationale, Specifics   9  61642 Therapeutic Exercise (timed):  increase ROM, strength, coordination, balance, and proprioception to improve patient's ability to progress to PLOF and address remaining functional goals. (see flow sheet as applicable)   Details if applicable:       14  86766 Therapeutic Activity (timed):  use of dynamic activities replicating functional movements to increase ROM, strength, coordination, balance, and proprioception in order to improve patient's ability to progress to PLOF and address remaining functional goals.  (see flow sheet as applicable)   Details if applicable:       13  76201 Neuromuscular Re-Education (timed):  improve balance, coordination, kinesthetic sense, posture, core stability and proprioception to improve patient's ability to develop conscious control of individual muscles and awareness of position of extremities in order to progress to PLOF and address remaining functional goals. (see flow sheet as applicable)     Details if applicable:       8  72674 Gait

## 2025-02-12 ENCOUNTER — HOSPITAL ENCOUNTER (OUTPATIENT)
Facility: HOSPITAL | Age: 45
Setting detail: RECURRING SERIES
Discharge: HOME | End: 2025-02-15
Payer: MEDICAID

## 2025-02-12 PROCEDURE — 97112 NEUROMUSCULAR REEDUCATION: CPT

## 2025-02-12 PROCEDURE — 97530 THERAPEUTIC ACTIVITIES: CPT

## 2025-02-12 PROCEDURE — 97116 GAIT TRAINING THERAPY: CPT

## 2025-02-12 PROCEDURE — 97110 THERAPEUTIC EXERCISES: CPT

## 2025-02-12 NOTE — PROGRESS NOTES
PHYSICAL / OCCUPATIONAL THERAPY - DAILY TREATMENT NOTE    Patient Name: Kalie López    Date: 2025    : 1980  Insurance: Payor: [facility] PL / Plan: UHC MEDICAID COMM[employer] [place] [place] / Product Type: *No Product type* /      Patient  verified Yes     Visit #   Current / Total 3 4   Time   In / Out 11:00 am 11:42 am   Pain   In / Out 0/10 010   Subjective Functional Status/Changes: \"I have been feeling pretty good.\"     TREATMENT AREA =  Pain in left knee [M25.562]  Pain in right knee [M25.561]     OBJECTIVE  Therapeutic Procedures:  Tx Min Procedure, Rationale, Specifics   10 30674 Therapeutic Exercise (timed):  increase ROM, strength, coordination, balance, and proprioception to improve patient's ability to progress to PLOF and address remaining functional goals. (see flow sheet as applicable)     Details if applicable:       12 73185 Therapeutic Activity (timed):  use of dynamic activities replicating functional movements to increase ROM, strength, coordination, balance, and proprioception in order to improve patient's ability to progress to PLOF and address remaining functional goals.  (see flow sheet as applicable)     Details if applicable:       12 71514 Neuromuscular Re-Education (timed):  improve balance, coordination, kinesthetic sense, posture, core stability and proprioception to improve patient's ability to develop conscious control of individual muscles and awareness of position of extremities in order to progress to PLOF and address remaining functional goals. (see flow sheet as applicable)     Details if applicable:       8 91173 Gait Training (timed):      40 feet x 2 of following-  [x] Banded March            [] Lateral                   [] Horizontal HT  [x] Tandem         [x] Banded Lateral     [] Vertical HT  [] Retrograde    [x] Banded Monster   [] Dual Task   42 [facility] Totals Reminder: bill using total billable min of TIMED therapeutic procedures

## 2025-02-14 ENCOUNTER — HOSPITAL ENCOUNTER (OUTPATIENT)
Facility: HOSPITAL | Age: 45
Setting detail: RECURRING SERIES
Discharge: HOME | End: 2025-02-17
Payer: MEDICAID

## 2025-02-14 PROCEDURE — 97112 NEUROMUSCULAR REEDUCATION: CPT

## 2025-02-14 PROCEDURE — 97530 THERAPEUTIC ACTIVITIES: CPT

## 2025-02-14 PROCEDURE — 97110 THERAPEUTIC EXERCISES: CPT

## 2025-02-14 NOTE — PROGRESS NOTES
Physical Therapy Discharge Instructions  [facility]   [address] [address] [phone] fax    Patient: Kalie López  : 1980    Continue Home Exercise Program regularly. Stretching and mobility exercises can be performed daily. Strengthening exercises should be performed about 2-3x per week.     Continue with    [x] Ice  as needed   [x] Heat         Follow up with MD:     [] Upon completion of therapy  [x] As needed    Recommendations:     [x]   Return to activity with home program  []   Return to activity with the following modifications:     []Post Rehab Program  []Join Independent aquatic program   []Return to/join local gym    Additional Comments: Kalie, thank you for your hard work during your therapy sessions and congratulations on your progress! It has been a pleasure working with you. Please reach out in the future if you have any questions.     Adan Milton, PT 2025 11:54 AM  
                Cristina Maradiaga PA-C    
Eloisa HITCHCOCK MD PBP BS AMB   6/24/2025 11:15 AM Maciej Katz Sr., MD HRCARDNOR BS AMB

## 2025-02-21 ENCOUNTER — OFFICE VISIT (OUTPATIENT)
Age: 45
End: 2025-02-21

## 2025-02-21 VITALS
OXYGEN SATURATION: 100 % | DIASTOLIC BLOOD PRESSURE: 78 MMHG | WEIGHT: 213 LBS | HEART RATE: 92 BPM | SYSTOLIC BLOOD PRESSURE: 134 MMHG | HEIGHT: 63 IN | BODY MASS INDEX: 37.74 KG/M2 | TEMPERATURE: 98.1 F | RESPIRATION RATE: 18 BRPM

## 2025-02-21 DIAGNOSIS — I25.118 CORONARY ARTERY DISEASE OF NATIVE HEART WITH STABLE ANGINA PECTORIS, UNSPECIFIED VESSEL OR LESION TYPE: ICD-10-CM

## 2025-02-21 DIAGNOSIS — G47.33 OSA (OBSTRUCTIVE SLEEP APNEA): Primary | ICD-10-CM

## 2025-02-21 DIAGNOSIS — Z99.89 CPAP (CONTINUOUS POSITIVE AIRWAY PRESSURE) DEPENDENCE: Chronic | ICD-10-CM

## 2025-02-21 DIAGNOSIS — I10 HYPERTENSION, UNSPECIFIED TYPE: ICD-10-CM

## 2025-02-21 DIAGNOSIS — J42 CHRONIC BRONCHITIS, UNSPECIFIED CHRONIC BRONCHITIS TYPE (HCC): ICD-10-CM

## 2025-02-21 NOTE — PROGRESS NOTES
Yessica Evans M.D  Pulmonary Critical Care & Sleep Medicine     Sleep Medicine Note    Name: Kalie López     : 1980     Date: 2025      Referring provider: No ref. provider found Dr Katz  PCP: Jalen Mabry MD   IMPRESSION:   44-year-old female with morbid obesity, COPD, diastolic heart failure, insomnia with snoring, witnessed apnea and excess daytime sleepiness with ESS of 15  Severe sleep apnea with AHI of 58- 64 RDI of 65 and lowest oxygen saturation of 88  Patient came for sleep study follow-up, discussed all the treatment options with the patient, including CPAP-auto CPAP, dental device and inspire.  After discussing all the pros and cons of the management, and due to severity of patient condition with daytime fatigue and comorbid condition, patient decided to proceed with CPAP  Prescription/referral for CPAP 12 sent  Simplus medium mask  Patient wish to go the prescription to MED and will send it to them  Will follow-up 3 months with compliance download    CPAP dependency:  Started on CPAP of 12, Simplus medium mask  That is issue with the CPAP of 12 consider auto CPAP 10-14 as patient had no REM supine sleep during current sleep study  If patient continued to have symptoms consider overnight oximetry on optimal CPAP  Prescription sent to MED per patient request  Insomnia:   Sleep maintenance insomnia could be related to JAMILA  Patient has issues with sleep hygiene, watching TV at nighttime discussed at length  Stimulus control and sleep restriction discussed at length to see whether that improves her sleep  Avoidance of smoking has also been discussed with patient at length to see whether that improves his sleep  We will optimize and treat underlying sleep disorder, if patient continued to have issues consider cognitive behavioral consultation with psychologist  COPD:   Strong possibility with active smoking and wheezing on exam  Currently on albuterol as needed  Will require

## 2025-02-21 NOTE — PROGRESS NOTES
Kalie López presents today for   Chief Complaint   Patient presents with    Follow-up     Sleep study results        Is someone accompanying this pt? No    Is the patient using any DME equipment during OV? No    -DME Company No    Depression Screenin/10/2025     8:28 AM   PHQ-9 Questionaire   Little interest or pleasure in doing things 0   Feeling down, depressed, or hopeless 2   PHQ-9 Total Score 2       Learning Assessment:    Failed to redirect to the Timeline version of the [employer] SmartLink.    Abuse Screening:         No data to display                Fall Risk    Failed to redirect to the Timeline version of the [employer] SmartLink.    Coordination of Care:    1. Have you been to the ER, urgent care clinic since your last visit? Hospitalized since your last visit? No    2. Have you seen or consulted any other health care providers outside of the [facility] since your last visit? Include any pap smears or colon screening. No    Medication list has been update per patient.

## 2025-02-21 NOTE — PATIENT INSTRUCTIONS
Office Note, Pulmonary  CHIEF COMPLAINT:  Chief Complaint   Patient presents with   • Office Visit     Follow up COPD   • Office Visit   • Imm/Inj     Flu vaccine       VITALS:  Visit Vitals  /79   Pulse 80   Resp (!) 24   Ht 5' 7\" (1.702 m)   Wt 78.9 kg (174 lb)   SpO2 97% Comment: Room air at rest   BMI 27.25 kg/m²       HPI:  She presents for follow up on COPD with asthma  Feels well  Patient reports mild chronic productive cough without a recent URI or increased respiratory symptoms.  No chest pain, weight loss or hemoptysis.     Current Outpatient Medications   Medication Sig Dispense Refill   • citalopram (CeleXA) 40 MG tablet TAKE 1 TABLET BY MOUTH EVERY DAY 30 tablet 0   • aMILoride (MIDAMOR) 5 MG tablet Take 2 tablets by mouth daily. 180 tablet 1   • potassium CHLORIDE (KLOR-CON) 20 MEQ packet Take 1 packet by mouth 2 times daily. 180 packet 1   • esomeprazole (NexIUM) 40 MG capsule Take 1 capsule by mouth daily. 90 capsule 3   • aspirin (ECOTRIN) 81 MG EC tablet Take 81 mg by mouth daily.     • fluticasone-umeclidin-vilanterol (Trelegy Ellipta) 100-62.5-25 MCG/INH inhaler Inhale 1 puff into the lungs daily. (Patient taking differently: Inhale 1 puff into the lungs daily. Using prn) 60 each 11   • zoster vaccine recomb adjuvanted (SHINGRIX) 50 MCG/0.5ML injection Inject 0.5 mLs into the muscle 1 time. Repeat dose in 2 to 6 months (unless 1 dose already given), for a total of 2 doses. 1 each 1   • amLODIPine (NORVASC) 10 MG tablet Take 10 mg by mouth daily.      • fluticasone (FLONASE) 50 MCG/ACT nasal spray Spray 1 spray in each nostril 2 times daily as needed (sinus darinage). 16 g 12   • Elastic Bandage Misc Please prescribe patient knee-high compression stockings, 20 to 30 mmHg.  Should be worn daily from the morning to nighttime.  Can remove at nighttime.    Code 99.     • albuterol (VENTOLIN) (2.5 MG/3ML) 0.083% nebulizer solution TAKE 3 MLS BY NEBULIZATION TWO TIMES DAILY 75 mL 11   •  Patient Education        Patient Education        Learning About Sleep Apnea  What is it?     Sleep apnea means that breathing stops for short periods during sleep. When you stop breathing or have reduced airflow into your lungs during sleep, you don't sleep well and you can be very tired during the day. The oxygen levels in your blood may go down, and carbon dioxide levels go up. It may lead to other problems, such as high blood pressure and heart disease.  Sleep apnea can range from mild to severe, based on how often breathing stops during sleep. For adults, breathing may stop as few as 5 times an hour (mild apnea) to 30 or more times an hour (severe apnea).  Obstructive sleep apnea is the most common type. This most often occurs because your airways are blocked or partly blocked. Central sleep apnea is less common. It happens when the brain has trouble controlling breathing. Some people have both types. That's called complex sleep apnea.  What are the symptoms?  There are symptoms of sleep apnea that you may notice and symptoms that others may notice when you're asleep.  Symptoms you may notice include:  Feeling extremely sleepy during the day.  Feeling unrefreshed or tired after a night's sleep.  Problems with memory and concentration, or mood changes.  Morning headaches.  Getting up often during the night to urinate.  A dry mouth or sore throat in the morning.  If you have a bed partner, they may notice that you:  Have episodes of not breathing.  Snore loudly. Almost all people who have sleep apnea snore. But not all people who snore have sleep apnea.  Toss and turn during sleep.  Have nighttime choking or gasping spells.  How is it diagnosed?  Your doctor will probably do a physical exam and ask about your past health. The doctor may also ask you or your bed partner about your snoring and sleep behavior and how tired you feel during the day.  Your doctor may suggest a sleep study. Sleep studies are a series of  acetaminophen (TYLENOL) 500 MG tablet Take 500 mg by mouth every 6 hours as needed for Pain.     • Cholecalciferol (HM VITAMIN D3) 4000 units Cap Take 4,000 Units by mouth daily (at noon).  30 capsule    • ferrous sulfate 325 (65 FE) MG tablet Take 1 tablet by mouth every other day. 45 tablet 1   • albuterol 108 (90 Base) MCG/ACT inhaler Inhale 2 puffs into the lungs every 4 hours as needed for Shortness of Breath or Wheezing. 1 Inhaler 5   • Calcium 600 MG tablet Take 1 tablet by mouth daily (at noon).      • Magnesium Oxide 400 (240 Mg) MG Tab Take 800 mg by mouth daily. 30 tablet 0   • rosuvastatin (CRESTOR) 40 MG tablet Take 0.5 tablets by mouth daily.     • Flaxseed, Linseed, (FLAX SEED OIL) 1000 MG capsule Take 1,000 mg by mouth daily.     • metoPROLOL (TOPROL-XL) 25 MG 24 hr tablet Take 25 mg by mouth daily.       No current facility-administered medications for this visit.       I have reviewed the patient's medications and allergies, past medical, surgical, social and family history,   updating these as appropriate.  See Histories section of the EMR for a display of this information.    Social History     Tobacco Use   Smoking Status Former Smoker   • Packs/day: 1.00   • Years: 30.00   • Pack years: 30.00   • Types: Cigarettes   • Quit date: 2005   • Years since quittin.8   Smokeless Tobacco Never Used       REVIEW OF SYSTEMS:  Otherwise unchanged      PHYSICAL EXAM:  Constitutional: ambulatory, no acute distress, non-toxic appearance.   Face mask on  Respiratory:  Respiratory effort normal, breath sounds mod.  no rales ,no rhonchi, no wheezing.  Cardiovascular:  Normal rate, normal rhythm, no murmur, no gallops, no rubs.   Musculoskeletal:  no edema, no tenderness, no deformities.  Exam otherwise deferred   Psychiatric:  Speech and behavior appropriate.        ASSESSMENT:  1. Need for vaccination    2 COPD with asthma without exacerbation  Continue current treatment    PLAN:  Orders Placed This  Encounter   • Influenza Quadrivalent Enhanced High Dose Split Pres Free 0.7 mL Pre-filled Vacc (Fluzone High Dose Quad; ages 65+ yrs) - [id number]       Return in about 1 year (around 10/28/2022), or if symptoms worsen or fail to improve.        Visit took about 30 minutes most of which were spent in discussion and counseling,   warning signs of exacerbations were discussed.    Electronically signed   Jeevan Rios MD  10/28/21

## 2025-02-24 ENCOUNTER — OFFICE VISIT (OUTPATIENT)
Facility: CLINIC | Age: 45
End: 2025-02-24

## 2025-02-24 ENCOUNTER — HOSPITAL ENCOUNTER (OUTPATIENT)
Facility: HOSPITAL | Age: 45
Setting detail: SPECIMEN
Discharge: HOME | End: 2025-02-27
Payer: MEDICAID

## 2025-02-24 VITALS
TEMPERATURE: 97.5 F | WEIGHT: 217.2 LBS | BODY MASS INDEX: 38.48 KG/M2 | OXYGEN SATURATION: 97 % | RESPIRATION RATE: 14 BRPM | HEIGHT: 63 IN | HEART RATE: 98 BPM

## 2025-02-24 DIAGNOSIS — F17.200 SMOKER: ICD-10-CM

## 2025-02-24 DIAGNOSIS — Z00.00 INITIAL MEDICARE ANNUAL WELLNESS VISIT: Primary | ICD-10-CM

## 2025-02-24 DIAGNOSIS — F33.41 RECURRENT MAJOR DEPRESSIVE DISORDER, IN PARTIAL REMISSION: ICD-10-CM

## 2025-02-24 DIAGNOSIS — E78.00 PURE HYPERCHOLESTEROLEMIA: ICD-10-CM

## 2025-02-24 DIAGNOSIS — E11.42 TYPE 2 DIABETES MELLITUS WITH DIABETIC POLYNEUROPATHY, WITH LONG-TERM CURRENT USE OF INSULIN (HCC): ICD-10-CM

## 2025-02-24 DIAGNOSIS — I10 PRIMARY HYPERTENSION: ICD-10-CM

## 2025-02-24 DIAGNOSIS — Z79.4 TYPE 2 DIABETES MELLITUS WITH DIABETIC POLYNEUROPATHY, WITH LONG-TERM CURRENT USE OF INSULIN (HCC): ICD-10-CM

## 2025-02-24 LAB
ALBUMIN SERPL-MCNC: 3.8 G/DL (ref 3.4–5)
ALBUMIN/GLOB SERPL: 1 (ref 0.8–1.7)
ALP SERPL-CCNC: 85 U/L (ref 45–117)
ALT SERPL-CCNC: 18 U/L (ref 13–56)
ANION GAP SERPL CALC-SCNC: 4 MMOL/L (ref 3–18)
AST SERPL-CCNC: 19 U/L (ref 10–38)
BILIRUB SERPL-MCNC: 0.6 MG/DL (ref 0.2–1)
BUN SERPL-MCNC: 20 MG/DL (ref 7–18)
BUN/CREAT SERPL: 17 (ref 12–20)
CALCIUM SERPL-MCNC: 9.3 MG/DL (ref 8.5–10.1)
CHLORIDE SERPL-SCNC: 108 MMOL/L (ref 100–111)
CHOLEST SERPL-MCNC: 223 MG/DL
CO2 SERPL-SCNC: 27 MMOL/L (ref 21–32)
CREAT SERPL-MCNC: 1.15 MG/DL (ref 0.6–1.3)
CREAT UR-MCNC: 168 MG/DL (ref 30–125)
ERYTHROCYTE [DISTWIDTH] IN BLOOD BY AUTOMATED COUNT: 13.7 % (ref 11.6–14.5)
EST. AVERAGE GLUCOSE BLD GHB EST-MCNC: 169 MG/DL
GLOBULIN SER CALC-MCNC: 3.7 G/DL (ref 2–4)
GLUCOSE SERPL-MCNC: 97 MG/DL (ref 74–99)
HBA1C MFR BLD: 7.5 % (ref 4.2–5.6)
HCT VFR BLD AUTO: 46.8 % (ref 35–45)
HDLC SERPL-MCNC: 48 MG/DL (ref 40–60)
HDLC SERPL: 4.6 (ref 0–5)
HGB BLD-MCNC: 14.6 G/DL (ref 12–16)
LDLC SERPL CALC-MCNC: 147 MG/DL (ref 0–100)
LIPID PANEL: ABNORMAL
MCH RBC QN AUTO: 30.9 PG (ref 24–34)
MCHC RBC AUTO-ENTMCNC: 31.2 G/DL (ref 31–37)
MCV RBC AUTO: 98.9 FL (ref 78–100)
MICROALBUMIN UR-MCNC: 31.4 MG/DL (ref 0–3)
MICROALBUMIN/CREAT UR-RTO: 187 MG/G (ref 0–30)
NRBC # BLD: 0 K/UL (ref 0–0.01)
NRBC BLD-RTO: 0 PER 100 WBC
PLATELET # BLD AUTO: 271 K/UL (ref 135–420)
PMV BLD AUTO: 11.6 FL (ref 9.2–11.8)
POTASSIUM SERPL-SCNC: 4.4 MMOL/L (ref 3.5–5.5)
PROT SERPL-MCNC: 7.5 G/DL (ref 6.4–8.2)
RBC # BLD AUTO: 4.73 M/UL (ref 4.2–5.3)
SODIUM SERPL-SCNC: 139 MMOL/L (ref 136–145)
TRIGL SERPL-MCNC: 140 MG/DL
VLDLC SERPL CALC-MCNC: 28 MG/DL
WBC # BLD AUTO: 8.7 K/UL (ref 4.6–13.2)

## 2025-02-24 PROCEDURE — 83036 HEMOGLOBIN GLYCOSYLATED A1C: CPT

## 2025-02-24 PROCEDURE — 36415 COLL VENOUS BLD VENIPUNCTURE: CPT

## 2025-02-24 PROCEDURE — 82570 ASSAY OF URINE CREATININE: CPT

## 2025-02-24 PROCEDURE — 80053 COMPREHEN METABOLIC PANEL: CPT

## 2025-02-24 PROCEDURE — 85027 COMPLETE CBC AUTOMATED: CPT

## 2025-02-24 PROCEDURE — 82043 UR ALBUMIN QUANTITATIVE: CPT

## 2025-02-24 PROCEDURE — 80061 LIPID PANEL: CPT

## 2025-02-24 RX ORDER — BUPROPION HYDROCHLORIDE 300 MG/1
300 TABLET ORAL EVERY MORNING
Qty: 90 TABLET | Refills: 1 | Status: SHIPPED | OUTPATIENT
Start: 2025-03-03

## 2025-02-24 RX ORDER — BUPROPION HYDROCHLORIDE 150 MG/1
150 TABLET ORAL EVERY MORNING
Qty: 7 TABLET | Refills: 0 | Status: SHIPPED | OUTPATIENT
Start: 2025-02-24 | End: 2025-03-03

## 2025-02-24 SDOH — ECONOMIC STABILITY: FOOD INSECURITY: WITHIN THE PAST 12 MONTHS, THE FOOD YOU BOUGHT JUST DIDN'T LAST AND YOU DIDN'T HAVE MONEY TO GET MORE.: NEVER TRUE

## 2025-02-24 SDOH — ECONOMIC STABILITY: FOOD INSECURITY: WITHIN THE PAST 12 MONTHS, YOU WORRIED THAT YOUR FOOD WOULD RUN OUT BEFORE YOU GOT MONEY TO BUY MORE.: NEVER TRUE

## 2025-02-24 ASSESSMENT — PATIENT HEALTH QUESTIONNAIRE - PHQ9
SUM OF ALL RESPONSES TO PHQ QUESTIONS 1-9: 16
1. LITTLE INTEREST OR PLEASURE IN DOING THINGS: SEVERAL DAYS
2. FEELING DOWN, DEPRESSED OR HOPELESS: NEARLY EVERY DAY
7. TROUBLE CONCENTRATING ON THINGS, SUCH AS READING THE NEWSPAPER OR WATCHING TELEVISION: NEARLY EVERY DAY
SUM OF ALL RESPONSES TO PHQ QUESTIONS 1-9: 16
5. POOR APPETITE OR OVEREATING: MORE THAN HALF THE DAYS
SUM OF ALL RESPONSES TO PHQ QUESTIONS 1-9: 16
3. TROUBLE FALLING OR STAYING ASLEEP: NEARLY EVERY DAY
9. THOUGHTS THAT YOU WOULD BE BETTER OFF DEAD, OR OF HURTING YOURSELF: NOT AT ALL
10. IF YOU CHECKED OFF ANY PROBLEMS, HOW DIFFICULT HAVE THESE PROBLEMS MADE IT FOR YOU TO DO YOUR WORK, TAKE CARE OF THINGS AT HOME, OR GET ALONG WITH OTHER PEOPLE: NOT DIFFICULT AT ALL
SUM OF ALL RESPONSES TO PHQ QUESTIONS 1-9: 16
4. FEELING TIRED OR HAVING LITTLE ENERGY: NEARLY EVERY DAY
6. FEELING BAD ABOUT YOURSELF - OR THAT YOU ARE A FAILURE OR HAVE LET YOURSELF OR YOUR FAMILY DOWN: SEVERAL DAYS
SUM OF ALL RESPONSES TO PHQ9 QUESTIONS 1 & 2: 4
8. MOVING OR SPEAKING SO SLOWLY THAT OTHER PEOPLE COULD HAVE NOTICED. OR THE OPPOSITE, BEING SO FIGETY OR RESTLESS THAT YOU HAVE BEEN MOVING AROUND A LOT MORE THAN USUAL: NOT AT ALL

## 2025-02-24 ASSESSMENT — VISUAL ACUITY
OD_CC: 20/20
OS_CC: 20/20

## 2025-02-24 ASSESSMENT — LIFESTYLE VARIABLES
HOW MANY STANDARD DRINKS CONTAINING ALCOHOL DO YOU HAVE ON A TYPICAL DAY: PATIENT DOES NOT DRINK
HOW OFTEN DO YOU HAVE A DRINK CONTAINING ALCOHOL: NEVER

## 2025-02-24 NOTE — PROGRESS NOTES
Kalie López is a 44 y.o. year old female who presents today for   Chief Complaint   Patient presents with    Medicare AWV        \"Have you been to the ER, urgent care clinic since your last visit?  Hospitalized since your last visit?\"   no     “Have you seen or consulted any other health care providers outside our system since your last visit?”   NO      “Have you had a pap smear?”    NO    Date of last Cervical Cancer screen (HPV or PAP): 5/20/2021       “Have you had a diabetic eye exam?”    NO     No diabetic eye exam on file           So Isaacs CMA  [facility]  Ph: [phone]  Fax: [phone]

## 2025-02-24 NOTE — PATIENT INSTRUCTIONS
To establish with a psychiatrist you have three options.  1) Wait for a psych clinic to call you after we send a referral.  2) Call your insurance company for a list of covered psychiatric providers and then call them to schedule an appointment.  3) Call the providers on the list I gave you to see if they accept your insurance and can see you.     Preventing Falls: Care Instructions  Injuries and health problems such as trouble walking or poor eyesight can increase your risk of falling. So can some medicines. But there are things you can do to help prevent falls. You can exercise to get stronger. You can also arrange your home to make it safer.    Talk to your doctor about the medicines you take. Ask if any of them increase the risk of falls and whether they can be changed or stopped.   Try to exercise regularly. It can help improve your strength and balance. This can help lower your risk of falling.         Practice fall safety and prevention.   Wear low-heeled shoes that fit well and give your feet good support. Talk to your doctor if you have foot problems that make this hard.  Carry a cellphone or wear a medical alert device that you can use to call for help.  Use stepladders instead of chairs to reach high objects. Don't climb if you're at risk for falls. Ask for help, if needed.  Wear the correct eyeglasses, if you need them.        Make your home safer.   Remove rugs, cords, clutter, and furniture from walkways.  Keep your house well lit. Use night-lights in hallways and bathrooms.  Install and use sturdy handrails on stairways.  Wear nonskid footwear, even inside. Don't walk barefoot or in socks without shoes.        Be safe outside.   Use handrails, curb cuts, and ramps whenever possible.  Keep your hands free by using a shoulder bag or backpack.  Try to walk in well-lit areas. Watch out for uneven ground, changes in pavement, and debris.  Be careful in the winter. Walk on the grass or gravel when sidewalks

## 2025-02-24 NOTE — PROGRESS NOTES
Kalie López (:  1980) is a 44 y.o. female here for evaluation of the following chief complaint(s):  Medicare AWV        ASSESSMENT/PLAN:  1. Initial Medicare annual wellness visit  2. Recurrent major depressive disorder, in partial remission  Assessment & Plan:  - uncontrolled  - pt to schedule with psych  - wellbutrin for smoking might help her mood   Orders:  -     External Referral To Psychiatry  -     Comprehensive Metabolic Panel; Future  3. Pure hypercholesterolemia  Assessment & Plan:  - update labs   Orders:  -     Lipid Panel; Future  4. Type 2 diabetes mellitus with diabetic polyneuropathy, with long-term current use of insulin (HCC)  Assessment & Plan:  - update A1C  - continuing seeing endo   Orders:  -     Hemoglobin A1C; Future  -     Albumin/Creatinine Ratio, Urine; Future  5. Primary hypertension  Assessment & Plan:  - controlled, no medication changes  - check labs   Orders:  -     Comprehensive Metabolic Panel; Future  -     CBC; Future  6. Smoker  Assessment & Plan:  - trial of wellbutrin   Orders:  -     CBC; Future  -     buPROPion (WELLBUTRIN XL) 150 MG extended release tablet; Take 1 tablet by mouth every morning for 7 days, Disp-7 tablet, R-0Normal  -     buPROPion (WELLBUTRIN XL) 300 MG extended release tablet; Take 1 tablet by mouth every morning, Disp-90 tablet, R-1Normal      Follow-up and Dispositions    Return in 4 weeks (on 3/24/2025) for smoking.         SUBJECTIVE/OBJECTIVE:  HPI  MDD  - mood depends on the day; believes she is going through menopause;   - resumed prozac 3 months ago, no benefit; mirtazapine was helpful in the past      Smoking  - still smoking, patches and gum are not helpful     ROS as stated above.    Pulse 98   Temp 97.5 °F (36.4 °C) (Temporal)   Resp 14   Ht 1.6 m (5' 3\")   Wt 98.5 kg (217 lb 3.2 oz)   LMP 2025   SpO2 97%   BMI 38.48 kg/m²     Physical Exam          An electronic signature was used to authenticate this

## 2025-02-24 NOTE — PROGRESS NOTES
Medicare Annual Wellness Visit    Kalie López is here for Medicare AWV    Assessment & Plan   Initial Medicare annual wellness visit     Return in 3 months (on 5/24/2025).     Subjective       Patient's complete Health Risk Assessment and screening values have been reviewed and are found in Flowsheets. The following problems were reviewed today and where indicated follow up appointments were made and/or referrals ordered.    Positive Risk Factor Screenings with Interventions:    Fall Risk:  Do you feel unsteady or are you worried about falling? : (!) yes  2 or more falls in past year?: no  Fall with injury in past year?: (!) yes     Interventions:    Reviewed medications, home hazards, visual acuity, and co-morbidities that can increase risk for falls; fell out of hospital bed due to medication sedation     Depression:  PHQ-2 Score: 4  PHQ-9 Total Score: 16  Total Score Interpretation: 15-19 = moderately severe depression  Interventions:  Patient comments: mood depends on the day; believes she is going through menopause; resumed proac 3 months ago, no benefit             Abnormal BMI (obese):  Body mass index is 38.48 kg/m². (!) Abnormal  Interventions:  Patient comments: plans to improve diet and exercise        Dentist Screen:  Have you seen the dentist within the past year?: (!) No    Intervention:  Patient comments: last seen 3 years ago; pt to look for one     Vision Screen:  Do you have difficulty driving, watching TV, or doing any of your daily activities because of your eyesight?: (!) Yes (at night)  Have you had an eye exam within the past year?: Yes  Interventions:   Patient comments: going to [facility]      Advanced Directives:  Do you have a Living Will?: (!) No    Intervention:  Godfrey López, brother, will make medical decisions        Tobacco Use:    Tobacco Use      Smoking status: Every Day        Packs/day: 0.15        Years: 1 pack/day for 29.9 years (29.4 ttl pk-yrs)        Types: Cigarettes

## 2025-03-03 ENCOUNTER — OFFICE VISIT (OUTPATIENT)
Age: 45
End: 2025-03-03
Payer: MEDICARE

## 2025-03-03 DIAGNOSIS — G89.29 CHRONIC LEFT SHOULDER PAIN: Primary | ICD-10-CM

## 2025-03-03 DIAGNOSIS — M25.512 CHRONIC LEFT SHOULDER PAIN: Primary | ICD-10-CM

## 2025-03-03 PROCEDURE — 99212 OFFICE O/P EST SF 10 MIN: CPT | Performed by: ORTHOPAEDIC SURGERY

## 2025-03-03 NOTE — PROGRESS NOTES
office dated 1/30/2025 read and reviewed by myself reveal: No acute normalities       IMPRESSION:      ICD-10-CM    1. Chronic left shoulder pain  M25.512     G89.29            PLAN:   1. Pt presents today with left shoulder pain likely due to rotator cuff tear but much improved. Since previous injections have provided relief, I will have her follow up for another injection if pain worsens.   Risk factors include:   2. No cortisone injection indicated today   3. No Physical/Occupational Therapy indicated today  4. No diagnostic test indicated today:   5. No durable medical equipment indicated today  6. No referral indicated today   7. No medications indicated today:   8. No Narcotic indicated today for short term acute pain secondary to  rotator cuff tear.. Patient given pain medication for short term acute pain relief. Goal is to treat patient according to above plan and to ultimately have patient off all pain medications once appropriate. If chronic pain management is required beyond what is expected for current orthopedic problem, will refer patient to pain management.  was reviewed and will be reviewed with every medication refill request.       RTC PRN       By signing my name below, I AMI Romo, attest that this documentation has been prepared under the direction and in the presence of Jason Goodson MD  Electronically signed: AMI Romo. 3/3/2025 7:25 AM EST    I, Jason Goodson MD, personally performed the services described in this documentation. I have authorized the scribe to complete the medical record entries input within this chart. I have reviewed the chart and agree that the record reflects my personal performance and is accurate and complete. [Electronically Signed: Jason Goodson MD. 3/3/2025 7:25 AM EST]

## 2025-03-14 ENCOUNTER — OFFICE VISIT (OUTPATIENT)
Facility: CLINIC | Age: 45
End: 2025-03-14

## 2025-03-14 VITALS
SYSTOLIC BLOOD PRESSURE: 118 MMHG | TEMPERATURE: 97.6 F | WEIGHT: 213.4 LBS | RESPIRATION RATE: 15 BRPM | OXYGEN SATURATION: 97 % | DIASTOLIC BLOOD PRESSURE: 82 MMHG | BODY MASS INDEX: 37.81 KG/M2 | HEART RATE: 103 BPM | HEIGHT: 63 IN

## 2025-03-14 DIAGNOSIS — J06.9 UPPER RESPIRATORY TRACT INFECTION, UNSPECIFIED TYPE: Primary | ICD-10-CM

## 2025-03-14 DIAGNOSIS — R05.1 ACUTE COUGH: ICD-10-CM

## 2025-03-14 LAB
GROUP A STREP ANTIGEN, POC: NEGATIVE
QUICKVUE INFLUENZA TEST: ABNORMAL
VALID INTERNAL CONTROL, POC: YES
VALID INTERNAL CONTROL, POC: YES

## 2025-03-14 RX ORDER — GUAIFENESIN/DEXTROMETHORPHAN 100-10MG/5
5 SYRUP ORAL 3 TIMES DAILY PRN
Qty: 120 ML | Refills: 0 | Status: SHIPPED | OUTPATIENT
Start: 2025-03-14 | End: 2025-03-24

## 2025-03-14 RX ORDER — AZITHROMYCIN 250 MG/1
250 TABLET, FILM COATED ORAL SEE ADMIN INSTRUCTIONS
Qty: 6 TABLET | Refills: 0 | Status: SHIPPED | OUTPATIENT
Start: 2025-03-14 | End: 2025-03-19

## 2025-03-14 RX ORDER — BENZONATATE 100 MG/1
100 CAPSULE ORAL 3 TIMES DAILY PRN
Qty: 30 CAPSULE | Refills: 0 | Status: SHIPPED | OUTPATIENT
Start: 2025-03-14 | End: 2025-03-24

## 2025-03-14 RX ORDER — PSEUDOEPHEDRINE HCL 30 MG/1
30 TABLET, FILM COATED ORAL EVERY 6 HOURS PRN
Qty: 56 TABLET | Refills: 1 | Status: SHIPPED | OUTPATIENT
Start: 2025-03-14 | End: 2026-03-14

## 2025-03-14 RX ORDER — PREDNISONE 20 MG/1
40 TABLET ORAL DAILY
Qty: 20 TABLET | Refills: 0 | Status: CANCELLED | OUTPATIENT
Start: 2025-03-14 | End: 2025-03-24

## 2025-03-14 NOTE — PROGRESS NOTES
Kalie López is a 44 y.o. year old female who presents today for   Chief Complaint   Patient presents with    Pharyngitis    Cough     Ear ache        \"Have you been to the ER, urgent care clinic since your last visit?  Hospitalized since your last visit?\"   NO      “Have you seen or consulted any other health care providers outside our system since your last visit?”   NO     Have you had a mammogram?”   NO    Date of last Mammogram: 3/2/2023      “Have you had a pap smear?”    NO    Date of last Cervical Cancer screen (HPV or PAP): 5/20/2021       “Have you had a diabetic eye exam?”    NO     No diabetic eye exam on file           So Isaacs CMA  [facility]  Ph: [phone]  Fax: [phone]

## 2025-03-14 NOTE — PROGRESS NOTES
Kalie López (:  1980) is a 44 y.o. female here for evaluation of the following chief complaint(s):  Pharyngitis and Cough (Ear ache)          ASSESSMENT/PLAN:  1. Upper respiratory tract infection, unspecified type  -     AMB POC RAPID STREP A  -     AMB POC RAPID INFLUENZA TEST  -     guaiFENesin-dextromethorphan (ROBITUSSIN DM) 100-10 MG/5ML syrup; Take 5 mLs by mouth 3 times daily as needed for Cough, Disp-120 mL, R-0Normal  -     benzonatate (TESSALON) 100 MG capsule; Take 1 capsule by mouth 3 times daily as needed for Cough, Disp-30 capsule, R-0Normal  -     azithromycin (ZITHROMAX) 250 MG tablet; Take 1 tablet by mouth See Admin Instructions for 5 days 500mg on day 1 followed by 250mg on days 2 - 5, Disp-6 tablet, R-0Normal  -     pseudoephedrine (DECONGESTANT) 30 MG tablet; Take 1 tablet by mouth every 6 hours as needed for Congestion, Disp-56 tablet, R-1Normal  2. Acute cough  -     guaiFENesin-dextromethorphan (ROBITUSSIN DM) 100-10 MG/5ML syrup; Take 5 mLs by mouth 3 times daily as needed for Cough, Disp-120 mL, R-0Normal  -     benzonatate (TESSALON) 100 MG capsule; Take 1 capsule by mouth 3 times daily as needed for Cough, Disp-30 capsule, R-0Normal  -     azithromycin (ZITHROMAX) 250 MG tablet; Take 1 tablet by mouth See Admin Instructions for 5 days 500mg on day 1 followed by 250mg on days 2 - 5, Disp-6 tablet, R-0Normal  -     pseudoephedrine (DECONGESTANT) 30 MG tablet; Take 1 tablet by mouth every 6 hours as needed for Congestion, Disp-56 tablet, R-1Normal    - negative strep and flu; likely viral URI, possible bacterial sinusitis, asthma exacerbation, doubt COPD exacerbation  - treat sxs; azithromyci if sxs do not improve after a week of conservative tx  - pt already has a OV in 11 days      SUBJECTIVE/OBJECTIVE:  HPI  URI sxs  - nasal congestion, DRY cough, frontal sinus pressure x 3 DAYS  - positive hx asthma/COPD  - no sick contacts  - vaccines: no flu, two initial COVID shots,

## 2025-03-17 RX ORDER — PREDNISONE 5 MG/1
10 TABLET ORAL DAILY
Status: SHIPPED | COMMUNITY
Start: 2025-03-17

## 2025-03-25 ENCOUNTER — OFFICE VISIT (OUTPATIENT)
Facility: CLINIC | Age: 45
End: 2025-03-25
Payer: MEDICARE

## 2025-03-25 VITALS
RESPIRATION RATE: 14 BRPM | HEIGHT: 63 IN | SYSTOLIC BLOOD PRESSURE: 135 MMHG | WEIGHT: 211.4 LBS | OXYGEN SATURATION: 97 % | DIASTOLIC BLOOD PRESSURE: 89 MMHG | BODY MASS INDEX: 37.46 KG/M2 | TEMPERATURE: 97.9 F | HEART RATE: 95 BPM

## 2025-03-25 DIAGNOSIS — G63 POLYNEUROPATHY ASSOCIATED WITH UNDERLYING DISEASE: ICD-10-CM

## 2025-03-25 DIAGNOSIS — F33.41 RECURRENT MAJOR DEPRESSIVE DISORDER, IN PARTIAL REMISSION: ICD-10-CM

## 2025-03-25 DIAGNOSIS — Z79.4 TYPE 2 DIABETES MELLITUS WITH DIABETIC POLYNEUROPATHY, WITH LONG-TERM CURRENT USE OF INSULIN (HCC): ICD-10-CM

## 2025-03-25 DIAGNOSIS — N95.1 HOT FLASHES DUE TO MENOPAUSE: ICD-10-CM

## 2025-03-25 DIAGNOSIS — E11.42 TYPE 2 DIABETES MELLITUS WITH DIABETIC POLYNEUROPATHY, WITH LONG-TERM CURRENT USE OF INSULIN (HCC): ICD-10-CM

## 2025-03-25 DIAGNOSIS — F17.200 SMOKER: Primary | ICD-10-CM

## 2025-03-25 DIAGNOSIS — N05.0 MINIMAL CHANGE DISEASE: ICD-10-CM

## 2025-03-25 PROCEDURE — 3075F SYST BP GE 130 - 139MM HG: CPT | Performed by: STUDENT IN AN ORGANIZED HEALTH CARE EDUCATION/TRAINING PROGRAM

## 2025-03-25 PROCEDURE — 99214 OFFICE O/P EST MOD 30 MIN: CPT | Performed by: STUDENT IN AN ORGANIZED HEALTH CARE EDUCATION/TRAINING PROGRAM

## 2025-03-25 PROCEDURE — 96127 BRIEF EMOTIONAL/BEHAV ASSMT: CPT | Performed by: STUDENT IN AN ORGANIZED HEALTH CARE EDUCATION/TRAINING PROGRAM

## 2025-03-25 PROCEDURE — 3051F HG A1C>EQUAL 7.0%<8.0%: CPT | Performed by: STUDENT IN AN ORGANIZED HEALTH CARE EDUCATION/TRAINING PROGRAM

## 2025-03-25 PROCEDURE — 99407 BEHAV CHNG SMOKING > 10 MIN: CPT | Performed by: STUDENT IN AN ORGANIZED HEALTH CARE EDUCATION/TRAINING PROGRAM

## 2025-03-25 PROCEDURE — 3079F DIAST BP 80-89 MM HG: CPT | Performed by: STUDENT IN AN ORGANIZED HEALTH CARE EDUCATION/TRAINING PROGRAM

## 2025-03-25 RX ORDER — GABAPENTIN 100 MG/1
100 CAPSULE ORAL
Qty: 90 CAPSULE | Refills: 1 | Status: SHIPPED | OUTPATIENT
Start: 2025-03-25 | End: 2025-05-24

## 2025-03-25 RX ORDER — GABAPENTIN 100 MG/1
100 CAPSULE ORAL
Qty: 90 CAPSULE | Refills: 1 | Status: SHIPPED | OUTPATIENT
Start: 2025-03-25 | End: 2025-03-25

## 2025-03-25 NOTE — PATIENT INSTRUCTIONS
You may increase the dose of the gabapentin by 100 mg weekly; maximum is 600mg.    I have ordered labs. Please try to complete your labs at least one week before your visit, so we can use the results to make sound treatment decisions.

## 2025-03-25 NOTE — PROGRESS NOTES
Kalie López (:  1980) is a 44 y.o. female here for evaluation of the following chief complaint(s):  Follow-up (smoking) and Nicotine Dependence        ASSESSMENT/PLAN:  1. Smoker  Assessment & Plan:  - discussed smoking cessation and associated risks for 11 minutes      2. Type 2 diabetes mellitus with diabetic polyneuropathy, with long-term current use of insulin ([facility])  Assessment & Plan:  - update A1C before next visit in    Orders:  -     Hemoglobin A1C; Future  -     gabapentin (NEURONTIN) 100 MG capsule; Take 1 capsule by mouth nightly for 60 days. Intended supply: 30 days Max Daily Amount: 100 mg, Disp-90 capsule, R-1Normal  3. Polyneuropathy associated with underlying disease  -     gabapentin (NEURONTIN) 100 MG capsule; Take 1 capsule by mouth nightly for 60 days. Intended supply: 30 days Max Daily Amount: 100 mg, Disp-90 capsule, R-1Normal  4. Hot flashes due to menopause  Assessment & Plan:  - uncontrolled  - resume gissell 100  QHS  -  reviewed and appropriate     Orders:  -     gabapentin (NEURONTIN) 100 MG capsule; Take 1 capsule by mouth nightly for 60 days. Intended supply: 30 days Max Daily Amount: 100 mg, Disp-90 capsule, R-1Normal  5. Recurrent major depressive disorder, in partial remission  Assessment & Plan:  - doing better  - continue avoiding cigs; her success has boosted her self esteem and lifted her mood   6. Minimal change disease  Assessment & Plan:  - labs for next visit in    Orders:  -     Comprehensive Metabolic Panel; Future      Follow-up and Dispositions    Return in about 3 months (around 2025) for DM, lipids.         SUBJECTIVE/OBJECTIVE:  Nicotine Dependence      MDD  - mood depends on the day; believes she is going through menopause;   - resumed prozac 3 months ago, no benefit; mirtazapine was helpful in the past  Update (25)  - doing better; no red flags; stopping cigs has helped       Smoking  - still smoking, patches and gum are not helpful

## 2025-03-25 NOTE — PROGRESS NOTES
Kalie López is a 44 y.o. year old female who presents today for   Chief Complaint   Patient presents with    Follow-up     smoking    Nicotine Dependence        \"Have you been to the ER, urgent care clinic since your last visit?  Hospitalized since your last visit?\"   NO      “Have you seen or consulted any other health care providers outside our system since your last visit?”   NO     Have you had a mammogram?”   NO    Date of last Mammogram: 3/2/2023      “Have you had a pap smear?”    NO    Date of last Cervical Cancer screen (HPV or PAP): 5/20/2021       “Have you had a diabetic eye exam?”    NO     No diabetic eye exam on file           So Isaacs CMA  [facility]  Ph: [phone]  Fax: [phone]   If you are a smoker, it is important for your health to stop smoking. Please be aware that second hand smoke is also harmful.

## 2025-03-26 PROBLEM — N95.1 HOT FLASHES DUE TO MENOPAUSE: Status: ACTIVE | Noted: 2025-03-26

## 2025-03-26 NOTE — ASSESSMENT & PLAN NOTE
- doing better  - continue avoiding cigs; her success has boosted her self esteem and lifted her mood

## 2025-04-10 ENCOUNTER — PATIENT MESSAGE (OUTPATIENT)
Age: 45
End: 2025-04-10

## 2025-04-10 DIAGNOSIS — G47.33 OSA (OBSTRUCTIVE SLEEP APNEA): Primary | ICD-10-CM

## 2025-04-10 NOTE — TELEPHONE ENCOUNTER
Patient called up complaining that pressure of 12 is too high for her    I will switch the patient to automated CPAP 8-14    Order has been placed    I will inform the stop to send the order to [employer]    Will continue to follow the patient as scheduled

## 2025-04-28 ENCOUNTER — OFFICE VISIT (OUTPATIENT)
Facility: CLINIC | Age: 45
End: 2025-04-28
Payer: MEDICARE

## 2025-04-28 VITALS
WEIGHT: 218 LBS | OXYGEN SATURATION: 97 % | RESPIRATION RATE: 16 BRPM | DIASTOLIC BLOOD PRESSURE: 82 MMHG | HEIGHT: 63 IN | BODY MASS INDEX: 38.62 KG/M2 | HEART RATE: 97 BPM | TEMPERATURE: 97.9 F | SYSTOLIC BLOOD PRESSURE: 114 MMHG

## 2025-04-28 DIAGNOSIS — N95.1 HOT FLASHES DUE TO MENOPAUSE: ICD-10-CM

## 2025-04-28 DIAGNOSIS — Z79.4 TYPE 2 DIABETES MELLITUS WITH DIABETIC POLYNEUROPATHY, WITH LONG-TERM CURRENT USE OF INSULIN (HCC): ICD-10-CM

## 2025-04-28 DIAGNOSIS — G63 POLYNEUROPATHY ASSOCIATED WITH UNDERLYING DISEASE: ICD-10-CM

## 2025-04-28 DIAGNOSIS — E11.42 TYPE 2 DIABETES MELLITUS WITH DIABETIC POLYNEUROPATHY, WITH LONG-TERM CURRENT USE OF INSULIN (HCC): ICD-10-CM

## 2025-04-28 PROCEDURE — 3079F DIAST BP 80-89 MM HG: CPT | Performed by: STUDENT IN AN ORGANIZED HEALTH CARE EDUCATION/TRAINING PROGRAM

## 2025-04-28 PROCEDURE — 99214 OFFICE O/P EST MOD 30 MIN: CPT | Performed by: STUDENT IN AN ORGANIZED HEALTH CARE EDUCATION/TRAINING PROGRAM

## 2025-04-28 PROCEDURE — 3051F HG A1C>EQUAL 7.0%<8.0%: CPT | Performed by: STUDENT IN AN ORGANIZED HEALTH CARE EDUCATION/TRAINING PROGRAM

## 2025-04-28 PROCEDURE — 3074F SYST BP LT 130 MM HG: CPT | Performed by: STUDENT IN AN ORGANIZED HEALTH CARE EDUCATION/TRAINING PROGRAM

## 2025-04-28 PROCEDURE — G2211 COMPLEX E/M VISIT ADD ON: HCPCS | Performed by: STUDENT IN AN ORGANIZED HEALTH CARE EDUCATION/TRAINING PROGRAM

## 2025-04-28 RX ORDER — GABAPENTIN 300 MG/1
600 CAPSULE ORAL
Qty: 180 CAPSULE | Refills: 1 | Status: SHIPPED | OUTPATIENT
Start: 2025-04-28 | End: 2025-04-28 | Stop reason: SDUPTHER

## 2025-04-28 RX ORDER — GABAPENTIN 300 MG/1
600 CAPSULE ORAL
Qty: 180 CAPSULE | Refills: 1 | Status: SHIPPED | OUTPATIENT
Start: 2025-04-28 | End: 2025-10-25

## 2025-04-28 NOTE — PATIENT INSTRUCTIONS
Please have your gynecology clinic send us your mammo and pap records.  [address] [address]  P: [phone]  F: [phone]

## 2025-04-28 NOTE — PROGRESS NOTES
Kalie López is a 45 y.o. year old female who presents today for   Chief Complaint   Patient presents with    Pain    Numbness    Tingling     In fingers        \"Have you been to the ER, urgent care clinic since your last visit?  Hospitalized since your last visit?\"   NO      “Have you seen or consulted any other health care providers outside our system since your last visit?”   NO     Have you had a mammogram?”   NO    Date of last Mammogram: 3/2/2023      “Have you had a pap smear?”    NO    Date of last Cervical Cancer screen (HPV or PAP): 5/20/2021       “Have you had a diabetic eye exam?”    NO     No diabetic eye exam on file           So Isaacs CMA  [facility]  Ph: [phone]  Fax: [phone]

## 2025-04-28 NOTE — PROGRESS NOTES
Kalie López (:  1980) is a 45 y.o. female here for evaluation of the following chief complaint(s):  Pain, Numbness, and Tingling (In fingers)        ASSESSMENT/PLAN:  1. Type 2 diabetes mellitus with diabetic polyneuropathy, with long-term current use of insulin ([facility])  Assessment & Plan:  - see endo next month; fu in clinic after that  - likely DM-related paresthesias    Orders:  -     gabapentin (NEURONTIN) 300 MG capsule; Take 2 capsules by mouth nightly for 180 days. Intended supply: 30 days Max Daily Amount: 600 mg, Disp-180 capsule, R-1Normal  2. Polyneuropathy associated with underlying disease  Assessment & Plan:  - increase gissell to 600mg  - PM&R ref for worsening neirpathy   Orders:  -     gabapentin (NEURONTIN) 300 MG capsule; Take 2 capsules by mouth nightly for 180 days. Intended supply: 30 days Max Daily Amount: 600 mg, Disp-180 capsule, R-1Normal  -     [facility] - Korey Baumann MD, Physical Medicine & Rehab, [facility])  3. Hot flashes due to menopause  Assessment & Plan:  - controlled on gabapntin   Orders:  -     gabapentin (NEURONTIN) 300 MG capsule; Take 2 capsules by mouth nightly for 180 days. Intended supply: 30 days Max Daily Amount: 600 mg, Disp-180 capsule, R-1Normal      Follow-up and Dispositions    Return for keep  appt.         SUBJECTIVE/OBJECTIVE:  Pain  Associated symptoms include numbness.   Neuropathy        Hot flashes  - 300-600mg gabapentin is helpful    T2DM  - sees Amanda hargrove; last on 24; fu in April  - latest A1C around 8  - regular insulin 36U and SS, Lantus 10U, farxiga 10  Update (24)  - to see endo on ; more recent A1C is 10  - diet seems appropriate; taking prednisone  - has not seen opto; Dr. Al at [facility] in [facility]  Update (24)  - saw endo; they are working on an insulin pump; f/u in July  - predisone (for CKD/DELORES) is a challenge  - reports moderate numbness/tingling in both feet x 6 months  Update

## 2025-04-30 LAB — HBA1C MFR BLD HPLC: 8 %

## 2025-05-13 RX ORDER — FAMOTIDINE 40 MG/1
40 TABLET, FILM COATED ORAL DAILY
Qty: 30 TABLET | Refills: 0 | Status: SHIPPED | OUTPATIENT
Start: 2025-05-13

## 2025-05-13 NOTE — TELEPHONE ENCOUNTER
Medication(s) requesting:   Requested Prescriptions     Pending Prescriptions Disp Refills    famotidine (PEPCID) 40 MG tablet [Pharmacy Med Name: Famotidine 40 MG Oral Tablet] 30 tablet 0     Sig: Take 1 tablet by mouth once daily        Last office visit:  4/28/25  Next office visit DMA: 6/26/2025

## 2025-05-14 ENCOUNTER — OFFICE VISIT (OUTPATIENT)
Age: 45
End: 2025-05-14
Payer: MEDICARE

## 2025-05-14 VITALS
WEIGHT: 220.8 LBS | RESPIRATION RATE: 18 BRPM | HEART RATE: 99 BPM | BODY MASS INDEX: 39.11 KG/M2 | DIASTOLIC BLOOD PRESSURE: 79 MMHG | SYSTOLIC BLOOD PRESSURE: 126 MMHG | OXYGEN SATURATION: 95 % | TEMPERATURE: 97.9 F

## 2025-05-14 DIAGNOSIS — G47.33 SEVERE OBSTRUCTIVE SLEEP APNEA: ICD-10-CM

## 2025-05-14 DIAGNOSIS — J42 CHRONIC BRONCHITIS, UNSPECIFIED CHRONIC BRONCHITIS TYPE (HCC): Primary | ICD-10-CM

## 2025-05-14 DIAGNOSIS — F17.200 CURRENT SMOKER: ICD-10-CM

## 2025-05-14 DIAGNOSIS — R06.02 SOB (SHORTNESS OF BREATH): ICD-10-CM

## 2025-05-14 PROCEDURE — 3074F SYST BP LT 130 MM HG: CPT | Performed by: INTERNAL MEDICINE

## 2025-05-14 PROCEDURE — 3078F DIAST BP <80 MM HG: CPT | Performed by: INTERNAL MEDICINE

## 2025-05-14 PROCEDURE — 99204 OFFICE O/P NEW MOD 45 MIN: CPT | Performed by: INTERNAL MEDICINE

## 2025-05-14 RX ORDER — METFORMIN HYDROCHLORIDE 500 MG/1
500 TABLET, EXTENDED RELEASE ORAL DAILY
COMMUNITY

## 2025-05-14 ASSESSMENT — ENCOUNTER SYMPTOMS
VOMITING: 0
BLOOD IN STOOL: 0
TROUBLE SWALLOWING: 0
EYE ITCHING: 0
PHOTOPHOBIA: 0
SORE THROAT: 0
COLOR CHANGE: 0
EYE PAIN: 0
COUGH: 0
STRIDOR: 0
WHEEZING: 0
EYE DISCHARGE: 0
CHOKING: 0
VOICE CHANGE: 0
SHORTNESS OF BREATH: 1
EYE REDNESS: 0
NAUSEA: 0
RHINORRHEA: 0
FACIAL SWELLING: 0
APNEA: 0
ABDOMINAL PAIN: 0
BACK PAIN: 0

## 2025-05-14 NOTE — PROGRESS NOTES
Kalie López presents today for   Chief Complaint   Patient presents with    New Patient     Referred by Dr. Yessica Evans for chronic bronchitis       Is someone accompanying this pt? No    Is the patient using any DME equipment during OV? CPAP    [facility] [facility]    Depression Screenin/24/2025    10:06 AM   PHQ-9 Questionaire   Little interest or pleasure in doing things 1   Feeling down, depressed, or hopeless 3   Trouble falling or staying asleep, or sleeping too much 3   Feeling tired or having little energy 3   Poor appetite or overeating 2   Feeling bad about yourself - or that you are a failure or have let yourself or your family down 1   Trouble concentrating on things, such as reading the newspaper or watching television 3   Moving or speaking so slowly that other people could have noticed. Or the opposite - being so fidgety or restless that you have been moving around a lot more than usual 0   Thoughts that you would be better off dead, or of hurting yourself in some way 0   PHQ-9 Total Score 16   If you checked off any problems, how difficult have these problems made it for you to do your work, take care of things at home, or get along with other people? 0       Learning Needs Questionnaire:     No question data found.      Fall Risk:         2025    10:05 AM 2024     2:44 PM 2024     2:21 PM   Fall Risk   Do you feel unsteady or are you worried about falling?  yes no yes   2 or more falls in past year? no no yes   Fall with injury in past year? yes no no        Abuse Screenin/11/2024     2:00 PM 2024     9:00 AM 2024     2:00 PM 3/14/2024     1:00 PM   AMB Abuse Screening   Do you ever feel afraid of your partner? N  N N   Are you in a relationship with someone who physically or mentally threatens you? N N N N   Is it safe for you to go home? Y Y Y Y         Coordination of Care:    1. Have you been to the ER, urgent care clinic since your last visit? 
for cold intolerance, heat intolerance, polydipsia, polyphagia and polyuria.   Genitourinary:  Negative for dysuria, flank pain, hematuria and urgency.   Musculoskeletal:  Negative for arthralgias, back pain, gait problem, joint swelling, myalgias, neck pain and neck stiffness.   Skin:  Negative for color change, pallor, rash and wound.   Allergic/Immunologic: Negative for environmental allergies, food allergies and immunocompromised state.   Neurological:  Negative for tremors, seizures, syncope, facial asymmetry, speech difficulty, weakness, light-headedness and numbness.   Hematological:  Negative for adenopathy. Does not bruise/bleed easily.   Psychiatric/Behavioral:  Negative for confusion, dysphoric mood, self-injury, sleep disturbance and suicidal ideas. The patient is not nervous/anxious and is not hyperactive.         Past Medical History:   Diagnosis Date    CAD (coronary artery disease)     CHF (congestive heart failure) (HCC)     Chronic pelvic pain in female     Kidney disease      Past Surgical History:   Procedure Laterality Date    COLONOSCOPY N/A 10/29/2024    COLONOSCOPY performed by Anupam Parra MD at [facility] ENDOSCOPY    ENDOSCOPY, COLON, DIAGNOSTIC      GYN  2010    Right Ovary and Fallopian Tube Removal    GYN  2011    ABLATION    GYN  2000    Caesarean     OTHER SURGICAL HISTORY  10/2020    I&D CELLULITIS- BUTTOCKS    UPPER GASTROINTESTINAL ENDOSCOPY N/A 10/29/2024    EGD performed by Anupam Parra MD at [facility] ENDOSCOPY     Current Outpatient Medications on File Prior to Visit   Medication Sig Dispense Refill    metFORMIN (GLUCOPHAGE-XR) 500 MG extended release tablet Take 1 tablet by mouth daily      famotidine (PEPCID) 40 MG tablet Take 1 tablet by mouth once daily 30 tablet 0    gabapentin (NEURONTIN) 300 MG capsule Take 2 capsules by mouth nightly for 180 days. Intended supply: 30 days Max Daily Amount: 600 mg 180 capsule 1    predniSONE (DELTASONE) 5 MG tablet Take 2 tablets by mouth

## 2025-06-03 DIAGNOSIS — Z79.4 TYPE 2 DIABETES MELLITUS WITH STAGE 4 CHRONIC KIDNEY DISEASE, WITH LONG-TERM CURRENT USE OF INSULIN (HCC): ICD-10-CM

## 2025-06-03 DIAGNOSIS — N18.4 TYPE 2 DIABETES MELLITUS WITH STAGE 4 CHRONIC KIDNEY DISEASE, WITH LONG-TERM CURRENT USE OF INSULIN (HCC): ICD-10-CM

## 2025-06-03 DIAGNOSIS — E11.22 TYPE 2 DIABETES MELLITUS WITH STAGE 4 CHRONIC KIDNEY DISEASE, WITH LONG-TERM CURRENT USE OF INSULIN (HCC): ICD-10-CM

## 2025-06-04 NOTE — TELEPHONE ENCOUNTER
Medication(s) requesting:   Requested Prescriptions     Pending Prescriptions Disp Refills    FARXIGA 10 MG tablet [Pharmacy Med Name: FARXIGA 10 MG TABLET] 90 tablet 3     Sig: TAKE 1 TABLET BY MOUTH ONCE DAILY IN THE MORNING       Last Appointment:  4/28/2025  Future Appointments   Date Time Provider Department Center   6/24/2025 11:15 AM Maciej Katz Sr., MD [facility]   6/26/2025 11:00 AM Jalen Mabry MD [facility]   7/9/2025 11:45 AM Yessica Evans MD [facility]   7/29/2025 10:00 AM Korey Baumann MD V[facility]OO [facility]   9/9/2025  8:00 AM PPA SPIROMETRY [facility]   9/9/2025  9:00 AM PPA SPIROMETRY [facility]   9/9/2025 10:00 AM Eloisa Lopez MD [facility] AMB

## 2025-06-06 NOTE — TELEPHONE ENCOUNTER
Pharmacy medication refill request      buPROPion (WELLBUTRIN XL) 300 MG extended release tablet      Lov 05/14/2025  Nov 06/24/2025      Please advise      Thank you

## 2025-06-07 RX ORDER — DAPAGLIFLOZIN 10 MG/1
10 TABLET, FILM COATED ORAL EVERY MORNING
Qty: 90 TABLET | Refills: 0 | Status: SHIPPED | OUTPATIENT
Start: 2025-06-07

## 2025-07-09 ENCOUNTER — OFFICE VISIT (OUTPATIENT)
Age: 45
End: 2025-07-09
Payer: MEDICARE

## 2025-07-09 VITALS
HEIGHT: 63 IN | TEMPERATURE: 96.9 F | SYSTOLIC BLOOD PRESSURE: 110 MMHG | WEIGHT: 211 LBS | OXYGEN SATURATION: 96 % | DIASTOLIC BLOOD PRESSURE: 75 MMHG | HEART RATE: 86 BPM | BODY MASS INDEX: 37.39 KG/M2 | RESPIRATION RATE: 12 BRPM

## 2025-07-09 DIAGNOSIS — G47.33 OSA (OBSTRUCTIVE SLEEP APNEA): Primary | ICD-10-CM

## 2025-07-09 DIAGNOSIS — Z99.89 CPAP (CONTINUOUS POSITIVE AIRWAY PRESSURE) DEPENDENCE: ICD-10-CM

## 2025-07-09 DIAGNOSIS — I50.30 DIASTOLIC CONGESTIVE HEART FAILURE, UNSPECIFIED HF CHRONICITY (HCC): ICD-10-CM

## 2025-07-09 DIAGNOSIS — J42 CHRONIC BRONCHITIS, UNSPECIFIED CHRONIC BRONCHITIS TYPE (HCC): ICD-10-CM

## 2025-07-09 DIAGNOSIS — I10 HYPERTENSION, UNSPECIFIED TYPE: ICD-10-CM

## 2025-07-09 PROCEDURE — 3074F SYST BP LT 130 MM HG: CPT | Performed by: INTERNAL MEDICINE

## 2025-07-09 PROCEDURE — 3078F DIAST BP <80 MM HG: CPT | Performed by: INTERNAL MEDICINE

## 2025-07-09 PROCEDURE — 99214 OFFICE O/P EST MOD 30 MIN: CPT | Performed by: INTERNAL MEDICINE

## 2025-07-09 ASSESSMENT — SLEEP AND FATIGUE QUESTIONNAIRES
HOW LIKELY ARE YOU TO NOD OFF OR FALL ASLEEP WHEN YOU ARE A PASSENGER IN A CAR FOR AN HOUR WITHOUT A BREAK: MODERATE CHANCE OF DOZING
HOW LIKELY ARE YOU TO NOD OFF OR FALL ASLEEP IN A CAR, WHILE STOPPED FOR A FEW MINUTES IN TRAFFIC: WOULD NEVER DOZE
ESS TOTAL SCORE: 15
HOW LIKELY ARE YOU TO NOD OFF OR FALL ASLEEP WHILE SITTING AND READING: MODERATE CHANCE OF DOZING
HOW LIKELY ARE YOU TO NOD OFF OR FALL ASLEEP WHILE SITTING INACTIVE IN A PUBLIC PLACE: HIGH CHANCE OF DOZING
HOW LIKELY ARE YOU TO NOD OFF OR FALL ASLEEP WHILE LYING DOWN TO REST IN THE AFTERNOON WHEN CIRCUMSTANCES PERMIT: HIGH CHANCE OF DOZING
HOW LIKELY ARE YOU TO NOD OFF OR FALL ASLEEP WHILE SITTING AND TALKING TO SOMEONE: WOULD NEVER DOZE
HOW LIKELY ARE YOU TO NOD OFF OR FALL ASLEEP WHILE SITTING QUIETLY AFTER LUNCH WITHOUT ALCOHOL: MODERATE CHANCE OF DOZING
HOW LIKELY ARE YOU TO NOD OFF OR FALL ASLEEP WHILE WATCHING TV: HIGH CHANCE OF DOZING

## 2025-07-09 NOTE — PROGRESS NOTES
Kalie López presents today for   Chief Complaint   Patient presents with    Sleep Apnea       Is someone accompanying this pt? No    Is the patient using any DME equipment during OV? cpap    [facility] [facility]    Depression Screenin/24/2025    10:06 AM   PHQ-9 Questionaire   Little interest or pleasure in doing things 1   Feeling down, depressed, or hopeless 3   Trouble falling or staying asleep, or sleeping too much 3   Feeling tired or having little energy 3   Poor appetite or overeating 2   Feeling bad about yourself - or that you are a failure or have let yourself or your family down 1   Trouble concentrating on things, such as reading the newspaper or watching television 3   Moving or speaking so slowly that other people could have noticed. Or the opposite - being so fidgety or restless that you have been moving around a lot more than usual 0   Thoughts that you would be better off dead, or of hurting yourself in some way 0   PHQ-9 Total Score 16   If you checked off any problems, how difficult have these problems made it for you to do your work, take care of things at home, or get along with other people? 0       Learning Assessment:    Failed to redirect to the Timeline version of the [employer].    Abuse Screening:         No data to display                Fall Risk    Failed to redirect to the Timeline version of the [employer].    Coordination of Care:    1. Have you been to the ER, urgent care clinic since your last visit? Hospitalized since your last visit? no    2. Have you seen or consulted any other health care providers outside of the [facility] since your last visit? Include any pap smears or colon screening. no    Medication list has been update per patient.  
Before CPAP 15 after CPAP 9 out of 24  Sleepiness has improved Fatigue has improved Snoring resolved Yes  Apnea resolved Yes  Gasping/choking resolved Headaches decreased Yes  Memory better Focus/concentration improved Sleep is less restless yes  Sleep quality has improved Less irritable yes  Napping less frequently yes              What type of mask are you currently using?  Nasal/fullface    How old is your cushion April 2025  How long have you been on CPAP therapy? Initial CPAP February 2025  How old is your current PAP machine?  April 2025  How old is your current headgear?  April 2025  How many nights per week are you using your CPAP? Every night    Are you experiencing any problems with your CPAP? (Check all that apply)    Mask problems: Skin irritation occasionally  Moisture no   Eye irritation Mask discomfort no   Nasal symptoms:   Nasal/oral dryness yes  Nose bleeds no  Nasal congestion no  Pressure problems:   Too high no   Too low no  Still snoring Air leaks no  Insomnia:   Falling asleep no  Staying asleep no  Other problems:  Abdominal pain no    Noise no       Feb 2025: Status post sleep study which showed severe sleep apnea AHI of 58 RDI of 64-started on CPAP of 12, patient called back later on stating that CPAP pressure of 12 is too high, switch to automated CPAP 8-14 in April 2025  SUBJECTIVE: 44-year-old female with history of CAD, diastolic heart failure, currently follows with cardiology for stable angina, active smoker with possible COPD, comes in with complaint of snoring, snorting arousal, choking episode, sweating at nighttime, morning headache, grinding teeth at nighttime.  Patient also has component of sleep related scratching, nothing to suggest narcolepsy/cataplexy or RLS, she also talks in her sleep.  As per the patient she also has issues with sleep maintenance insomnia, she falls asleep without any issue around 8 or 9 PM however she wakes up between 12 to 1 AM and sometimes she has

## 2025-07-09 NOTE — PATIENT INSTRUCTIONS
Yessica Evans M.D  Pulmonary Critical Care & Sleep Medicine    CPAP replacement scheduled    Cushions and pillows-2 weeks    Disposable filter-2 weeks    Fullface cushions-1 month    Fullface mask-3 months    Nasal and nasal pillow mask-3 months    Hose/tubing-3 months    Headgear's and chinstrap-6 months    Humidifier water chamber-6 months    Nondisposable filters-6 months    APAP/BiPAP/CPAP machines-5 years         Patient Education        Learning About CPAP for Sleep Apnea  What is CPAP?     CPAP is a small machine that you use at home every night while you sleep. It increases air pressure in your throat to keep your airway open. When you have sleep apnea, this can help you sleep better, feel better, and avoid future health problems. CPAP stands for \"continuous positive airway pressure.\"  The CPAP machine will have one of the following:  A mask that covers your nose and mouth  A mask that covers your nose only  A nasal pillow that covers only the openings of your nose  Why is it done?  CPAP is usually the best treatment for obstructive sleep apnea. It is the first treatment choice and the most widely used. CPAP:  Helps you have more normal sleep, so you feel less sleepy and more alert during the daytime.  May help keep heart failure or other heart problems from getting worse.  May help lower your blood pressure.  If you have a bed partner, they may also sleep better when you use a CPAP. That's because you aren't snoring or restless.  Your doctor may suggest CPAP if you have:  Moderate to severe sleep apnea.  Sleep apnea and coronary artery disease (CAD).  Sleep apnea and heart failure.  What are the side effects?  Some people who use CPAP have:  A dry or stuffy nose and a sore throat.  Irritated skin on the face.  Bloating.  How can you care for yourself?  If using CPAP is not comfortable, or if you have certain side effects, work with your doctor to fix them. Here are some things you can try:  Be sure the

## 2025-07-14 NOTE — TELEPHONE ENCOUNTER
PCP: Jalen Mabry MD    Last appt:  11/11/2024   Future Appointments   Date Time Provider Department Center   7/29/2025 10:00 AM Korey Baumann MD [facility]   9/9/2025  8:00 AM PPA SPIROMETRY [facility]   9/9/2025  9:00 AM PPA SPIROMETRY [facility]   9/9/2025 10:00 AM Eloisa Lopez MD [facility]   3/10/2026  2:15 PM Maciej Katz Sr., MD B[facility]   7/9/2026 11:30 AM Yessica Evans MD [facility]       Requested Prescriptions     Pending Prescriptions Disp Refills    losartan (COZAAR) 25 MG tablet [Pharmacy Med Name: LOSARTAN POTASSIUM 25 MG TAB] 30 tablet 3     Sig: TAKE 1 TABLET BY MOUTH EVERY DAY       Request for a 30 or 90 day supply? Provider Discretion    Pharmacy: confirmed    Other Comments:n/a

## 2025-07-16 DIAGNOSIS — F33.41 RECURRENT MAJOR DEPRESSIVE DISORDER, IN PARTIAL REMISSION: ICD-10-CM

## 2025-07-16 NOTE — TELEPHONE ENCOUNTER
Medication(s) requesting:   Requested Prescriptions     Pending Prescriptions Disp Refills    FLUoxetine (PROZAC) 20 MG capsule [Pharmacy Med Name: FLUoxetine HCl 20 MG Oral Capsule] 90 capsule 0     Sig: TAKE 3 CAPSULES BY MOUTH ONCE DAILY        Last Appointment:  4/28/2025    Future Appointments   Date Time Provider Department Center   7/29/2025 10:00 AM Korey Baumann MD [facility]   9/9/2025  8:00 AM PPA SPIROMETRY [facility]   9/9/2025  9:00 AM PPA SPIROMETRY [facility] AMB   9/9/2025 10:00 AM Eloisa Lopez MD [facility] AMB   3/10/2026  2:15 PM Maciej Katz Sr., MD [facility]   7/9/2026 11:30 AM Yessica Evans MD [facility] AMB

## 2025-07-17 DIAGNOSIS — E11.42 TYPE 2 DIABETES MELLITUS WITH DIABETIC POLYNEUROPATHY, WITH LONG-TERM CURRENT USE OF INSULIN (HCC): Primary | ICD-10-CM

## 2025-07-17 DIAGNOSIS — Z79.4 TYPE 2 DIABETES MELLITUS WITH DIABETIC POLYNEUROPATHY, WITH LONG-TERM CURRENT USE OF INSULIN (HCC): Primary | ICD-10-CM

## 2025-07-17 DIAGNOSIS — F33.41 RECURRENT MAJOR DEPRESSIVE DISORDER, IN PARTIAL REMISSION: ICD-10-CM

## 2025-07-17 DIAGNOSIS — E78.00 PURE HYPERCHOLESTEROLEMIA: ICD-10-CM

## 2025-07-17 NOTE — PROGRESS NOTES
Needs updated labs. Will supply 60 days of prozac. No more refills until seen in clinic        Please call pt to schedule two appointments: 1) lab appointment and 2) appointment to see me at least one week after that.     Thank you!

## 2025-07-18 RX ORDER — LOSARTAN POTASSIUM 25 MG/1
25 TABLET ORAL DAILY
Qty: 30 TABLET | Refills: 11 | Status: SHIPPED | OUTPATIENT
Start: 2025-07-18

## 2025-07-25 NOTE — PROGRESS NOTES
[facility]  [facility] [facility] [address], [address]  [place] [address]  Phone: [phone]  Fax: [phone]        Kalie López  : 1980  PCP: Jalen Mabry MD  2025    NEW PATIENT    HISTORY OF PRESENT ILLNESS  Kalie López is a 45 y.o. female c/o numbness/tingling in left leg and both hands. She also c/o left buttock pain radiating down the leg to the bottom of the foot. She also notes her hands get stuck at time. She notes her highest A1c was over 13. She saw KRISHNA Maradiaga and completed PT for b/l knee pain. She takes Gabapentin [id number] QHS with declining benefit. She recalls prior injections at [facility]. She regularly goes to gym. She goes on the treadmill and notes it starts to hurt after 15 minutes. She recalls bridges exercises provides benefit to pain. She previously took Aleve 6 times a day but notes she was hospitalized and was discussing dialysis back in .    Pain Score:   8/10     PmHx: CAD, CHF, CKD, DM (A1c 7.6, 25)       reviewed.    REVIEW OF SYSTEMS  Review of Systems   Constitutional:  Negative for fever and unexpected weight change.   HENT:  Negative for trouble swallowing.    Eyes:  Negative for visual disturbance.   Respiratory:  Negative for shortness of breath and wheezing.    Gastrointestinal:  Negative for nausea and vomiting.   Genitourinary:  Negative for enuresis.   Musculoskeletal:  Positive for back pain.   Neurological:  Positive for numbness. Negative for dizziness and headaches.   Psychiatric/Behavioral:  Negative for sleep disturbance. The patient is not nervous/anxious.      SPINE/MUSCULOSKELETAL EXAM  Back Exam     Tenderness   The patient is experiencing tenderness in the lumbar.    Range of Motion   Extension:  normal   Flexion:  normal   Rotation right:  normal   Rotation left:  normal     Tests   Straight leg raise right: negative  Straight leg raise left: negative    Reflexes

## 2025-07-29 ENCOUNTER — OFFICE VISIT (OUTPATIENT)
Age: 45
End: 2025-07-29
Payer: MEDICARE

## 2025-07-29 VITALS
HEART RATE: 77 BPM | WEIGHT: 222 LBS | DIASTOLIC BLOOD PRESSURE: 72 MMHG | BODY MASS INDEX: 39.34 KG/M2 | HEIGHT: 63 IN | TEMPERATURE: 97.7 F | SYSTOLIC BLOOD PRESSURE: 128 MMHG

## 2025-07-29 DIAGNOSIS — M79.18 CHRONIC PRIMARY MUSCULOSKELETAL PAIN: ICD-10-CM

## 2025-07-29 DIAGNOSIS — R20.0 NUMBNESS AND TINGLING IN BOTH HANDS: ICD-10-CM

## 2025-07-29 DIAGNOSIS — R20.2 NUMBNESS AND TINGLING IN BOTH HANDS: ICD-10-CM

## 2025-07-29 DIAGNOSIS — M54.16 LUMBAR RADICULOPATHY: ICD-10-CM

## 2025-07-29 DIAGNOSIS — R20.0 NUMBNESS AND TINGLING OF LEFT LOWER EXTREMITY: ICD-10-CM

## 2025-07-29 DIAGNOSIS — M25.561 CHRONIC PAIN OF BOTH KNEES: ICD-10-CM

## 2025-07-29 DIAGNOSIS — M54.50 LUMBAR PAIN: Primary | ICD-10-CM

## 2025-07-29 DIAGNOSIS — G89.29 CHRONIC PRIMARY MUSCULOSKELETAL PAIN: ICD-10-CM

## 2025-07-29 DIAGNOSIS — G89.29 CHRONIC PAIN OF BOTH KNEES: ICD-10-CM

## 2025-07-29 DIAGNOSIS — R20.2 NUMBNESS AND TINGLING OF LEFT LOWER EXTREMITY: ICD-10-CM

## 2025-07-29 DIAGNOSIS — M25.562 CHRONIC PAIN OF BOTH KNEES: ICD-10-CM

## 2025-07-29 PROCEDURE — 3074F SYST BP LT 130 MM HG: CPT | Performed by: PHYSICAL MEDICINE & REHABILITATION

## 2025-07-29 PROCEDURE — 99204 OFFICE O/P NEW MOD 45 MIN: CPT | Performed by: PHYSICAL MEDICINE & REHABILITATION

## 2025-07-29 PROCEDURE — 3078F DIAST BP <80 MM HG: CPT | Performed by: PHYSICAL MEDICINE & REHABILITATION

## 2025-07-29 ASSESSMENT — ENCOUNTER SYMPTOMS
TROUBLE SWALLOWING: 0
SHORTNESS OF BREATH: 0
WHEEZING: 0
NAUSEA: 0
BACK PAIN: 1
VOMITING: 0

## 2025-07-29 NOTE — PATIENT INSTRUCTIONS
Nikityl Routine:  [email or web address]    Patient Education        Electromyogram (EMG) and Nerve Conduction Studies: About These Tests  What are they?  An electromyogram (EMG) measures the electrical activity of your muscles when you are not using them (at rest) and when you tighten them (muscle contraction).  Nerve conduction studies (NCS) measure how well and how fast the nerves can send electrical signals.  EMG and nerve conduction studies are often done together. If they are done together, the nerve conduction studies are done before the EMG.  Why are they done?  You may need an EMG to find diseases that damage your muscles or nerves or to find out why you can't move your muscles (paralysis), why they feel weak, or why they twitch. These problems may include a herniated disc, amyotrophic lateral sclerosis (ALS), or myasthenia gravis (MG).  You may need nerve conduction studies to find damage to the nerves that connect the brain and spinal cord to the rest of the body. (This is called the peripheral nervous system.) These studies are often used to help find nerve disorders, such as carpal tunnel syndrome.  How do you prepare for these tests?  Wear loose-fitting clothing. You may be given a hospital gown to wear.  The electrodes for the test are attached to your skin. Your skin needs to be clean and free of sprays, oils, creams, and lotions.  You may be asked to sign a consent form that says you understand the risks of the test and agree to have it done.  Tell your doctor ALL the medicines, vitamins, supplements, and herbal remedies you take. Some may increase the risk of problems during your test. Your doctor will tell you if you should stop taking any of them before the test and how soon to do it.  If you take a medicine that prevents blood clots, your doctor may tell you to stop taking it before your test. Or your doctor may tell you to keep taking it.

## 2025-08-04 DIAGNOSIS — F33.41 RECURRENT MAJOR DEPRESSIVE DISORDER, IN PARTIAL REMISSION: ICD-10-CM

## 2025-08-07 ENCOUNTER — OFFICE VISIT (OUTPATIENT)
Facility: CLINIC | Age: 45
End: 2025-08-07
Payer: COMMERCIAL

## 2025-08-07 VITALS
DIASTOLIC BLOOD PRESSURE: 84 MMHG | HEIGHT: 63 IN | RESPIRATION RATE: 15 BRPM | SYSTOLIC BLOOD PRESSURE: 118 MMHG | HEART RATE: 101 BPM | BODY MASS INDEX: 39.27 KG/M2 | WEIGHT: 221.6 LBS | TEMPERATURE: 97.5 F | OXYGEN SATURATION: 98 %

## 2025-08-07 DIAGNOSIS — Z79.4 TYPE 2 DIABETES MELLITUS WITH DIABETIC POLYNEUROPATHY, WITH LONG-TERM CURRENT USE OF INSULIN (HCC): Primary | ICD-10-CM

## 2025-08-07 DIAGNOSIS — E11.42 TYPE 2 DIABETES MELLITUS WITH DIABETIC POLYNEUROPATHY, WITH LONG-TERM CURRENT USE OF INSULIN (HCC): Primary | ICD-10-CM

## 2025-08-07 DIAGNOSIS — E78.00 PURE HYPERCHOLESTEROLEMIA: ICD-10-CM

## 2025-08-07 DIAGNOSIS — K21.9 GASTROESOPHAGEAL REFLUX DISEASE WITHOUT ESOPHAGITIS: ICD-10-CM

## 2025-08-07 PROCEDURE — 3079F DIAST BP 80-89 MM HG: CPT | Performed by: STUDENT IN AN ORGANIZED HEALTH CARE EDUCATION/TRAINING PROGRAM

## 2025-08-07 PROCEDURE — G2211 COMPLEX E/M VISIT ADD ON: HCPCS | Performed by: STUDENT IN AN ORGANIZED HEALTH CARE EDUCATION/TRAINING PROGRAM

## 2025-08-07 PROCEDURE — 3052F HG A1C>EQUAL 8.0%<EQUAL 9.0%: CPT | Performed by: STUDENT IN AN ORGANIZED HEALTH CARE EDUCATION/TRAINING PROGRAM

## 2025-08-07 PROCEDURE — 3074F SYST BP LT 130 MM HG: CPT | Performed by: STUDENT IN AN ORGANIZED HEALTH CARE EDUCATION/TRAINING PROGRAM

## 2025-08-07 PROCEDURE — 99214 OFFICE O/P EST MOD 30 MIN: CPT | Performed by: STUDENT IN AN ORGANIZED HEALTH CARE EDUCATION/TRAINING PROGRAM

## 2025-08-07 RX ORDER — ATORVASTATIN CALCIUM 80 MG/1
80 TABLET, FILM COATED ORAL DAILY
Qty: 90 TABLET | Refills: 1 | Status: SHIPPED | OUTPATIENT
Start: 2025-08-07

## 2025-08-07 RX ORDER — FAMOTIDINE 40 MG/1
40 TABLET, FILM COATED ORAL DAILY
Qty: 30 TABLET | Refills: 0 | OUTPATIENT
Start: 2025-08-07

## 2025-08-07 RX ORDER — FAMOTIDINE 40 MG/1
40 TABLET, FILM COATED ORAL DAILY
Qty: 90 TABLET | Refills: 1 | Status: SHIPPED | OUTPATIENT
Start: 2025-08-07

## 2025-08-27 ENCOUNTER — HOSPITAL ENCOUNTER (OUTPATIENT)
Facility: HOSPITAL | Age: 45
Setting detail: RECURRING SERIES
Discharge: HOME | End: 2025-08-30
Attending: PHYSICAL MEDICINE & REHABILITATION
Payer: MEDICARE

## 2025-08-27 PROCEDURE — 97162 PT EVAL MOD COMPLEX 30 MIN: CPT

## (undated) DEVICE — TUBING, SUCTION, 9/32" X 10', STRAIGHT: Brand: MEDLINE

## (undated) DEVICE — TUBING IRRIGATION BK FLO VLV FOR OFP ENDOSTAT ENDOGATOR DISP

## (undated) DEVICE — SOLUTION IRRIG 500ML STRL H2O NONPYROGENIC

## (undated) DEVICE — CONMED SCOPE SAVER BITE BLOCK, 20X27 MM: Brand: SCOPE SAVER

## (undated) DEVICE — KIT COLON W/ 1.1OZ LUB AND 2 END

## (undated) DEVICE — Device: Brand: DEFENDO VALVE AND CONNECTOR KIT

## (undated) DEVICE — SYRINGE MED 50ML LUERSLIP TIP

## (undated) DEVICE — SOLUTION IRRIG 1000ML STRL H2O USP PLAS POUR BTL

## (undated) DEVICE — TUBING INSUFFLATION CAP W/ EXT CARBON DIOX ENDO SMARTCAP

## (undated) DEVICE — SYRINGE,TOOMEY,IRRIGATION,70CC,STERILE: Brand: MEDLINE